# Patient Record
Sex: FEMALE | Race: ASIAN | NOT HISPANIC OR LATINO | Employment: FULL TIME | URBAN - METROPOLITAN AREA
[De-identification: names, ages, dates, MRNs, and addresses within clinical notes are randomized per-mention and may not be internally consistent; named-entity substitution may affect disease eponyms.]

---

## 2018-03-13 ENCOUNTER — TRANSFERRED RECORDS (OUTPATIENT)
Dept: HEALTH INFORMATION MANAGEMENT | Facility: CLINIC | Age: 33
End: 2018-03-13

## 2018-07-06 ENCOUNTER — OFFICE VISIT (OUTPATIENT)
Dept: FAMILY MEDICINE | Facility: CLINIC | Age: 33
End: 2018-07-06
Payer: COMMERCIAL

## 2018-07-06 VITALS
HEART RATE: 77 BPM | BODY MASS INDEX: 35.85 KG/M2 | WEIGHT: 210 LBS | TEMPERATURE: 98 F | DIASTOLIC BLOOD PRESSURE: 78 MMHG | SYSTOLIC BLOOD PRESSURE: 117 MMHG | OXYGEN SATURATION: 97 % | HEIGHT: 64 IN

## 2018-07-06 DIAGNOSIS — J45.20 MILD INTERMITTENT ASTHMA WITHOUT COMPLICATION: ICD-10-CM

## 2018-07-06 DIAGNOSIS — Z00.00 ENCOUNTER FOR ROUTINE ADULT HEALTH EXAMINATION WITHOUT ABNORMAL FINDINGS: Primary | ICD-10-CM

## 2018-07-06 DIAGNOSIS — Z30.44 ENCOUNTER FOR SURVEILLANCE OF VAGINAL RING HORMONAL CONTRACEPTIVE DEVICE: ICD-10-CM

## 2018-07-06 PROCEDURE — 99395 PREV VISIT EST AGE 18-39: CPT | Performed by: FAMILY MEDICINE

## 2018-07-06 PROCEDURE — 87624 HPV HI-RISK TYP POOLED RSLT: CPT | Performed by: FAMILY MEDICINE

## 2018-07-06 PROCEDURE — G0145 SCR C/V CYTO,THINLAYER,RESCR: HCPCS | Performed by: FAMILY MEDICINE

## 2018-07-06 RX ORDER — ALBUTEROL SULFATE 90 UG/1
2 AEROSOL, METERED RESPIRATORY (INHALATION) EVERY 6 HOURS PRN
Qty: 1 INHALER | Refills: 1 | Status: SHIPPED | OUTPATIENT
Start: 2018-07-06

## 2018-07-06 RX ORDER — ETONOGESTREL AND ETHINYL ESTRADIOL VAGINAL RING .015; .12 MG/D; MG/D
RING VAGINAL
Qty: 3 EACH | Refills: 3 | Status: SHIPPED | OUTPATIENT
Start: 2018-07-06 | End: 2019-10-04

## 2018-07-06 NOTE — MR AVS SNAPSHOT
After Visit Summary   7/6/2018    Aure Kolb    MRN: [id number]           Patient Information     Date Of Birth          1985        Visit Information        Provider Department      7/6/2018 1:30 PM Neida George MD [facility]        Today's Diagnoses     Encounter for routine adult health examination without abnormal findings    -  1    Encounter for surveillance of vaginal ring hormonal contraceptive device        Mild intermittent asthma without complication          Care Instructions    Look into Whole 30 diet and FODMAPS.        Preventive Health Recommendations  Female Ages 26 - 39  Yearly exam:   See your health care provider every year in order to    Review health changes.     Discuss preventive care.      Review your medicines if you your doctor has prescribed any.    Until age 30: Get a Pap test every three years (more often if you have had an abnormal result).    After age 30: Talk to your doctor about whether you should have a Pap test every 3 years or have a Pap test with HPV screening every 5 years.   You do not need a Pap test if your uterus was removed (hysterectomy) and you have not had cancer.  You should be tested each year for STDs (sexually transmitted diseases), if you're at risk.   Talk to your provider about how often to have your cholesterol checked.  If you are at risk for diabetes, you should have a diabetes test (fasting glucose).  Shots: Get a flu shot each year. Get a tetanus shot every 10 years.   Nutrition:     Eat at least 5 servings of fruits and vegetables each day.    Eat whole-grain bread, whole-wheat pasta and brown rice instead of white grains and rice.    Get adequate Calcium and Vitamin D.     Lifestyle    Exercise at least 150 minutes a week (30 minutes a day, 5 days of the week). This will help you control your weight and prevent disease.    Limit alcohol to one drink per day.    No smoking.     Wear sunscreen to prevent skin  "cancer.    See your dentist every six months for an exam and cleaning.            Follow-ups after your visit        Who to contact     If you have questions or need follow up information about today's clinic visit or your schedule please contact [facility] directly at [phone].  Normal or non-critical lab and imaging results will be communicated to you by MyChart, letter or phone within 4 business days after the clinic has received the results. If you do not hear from us within 7 days, please contact the clinic through MyChart or phone. If you have a critical or abnormal lab result, we will notify you by phone as soon as possible.  Submit refill requests through [employer] or call your pharmacy and they will forward the refill request to us. Please allow 3 business days for your refill to be completed.          Additional Information About Your Visit        [place] Information     [facility] [employer] lets you send messages to your doctor, view your test results, renew your prescriptions, schedule appointments and more. To sign up, go to [email or web address] . Click on \"Log in\" on the left side of the screen, which will take you to the Welcome page. Then click on \"Sign up Now\" on the right side of the page.     You will be asked to enter the access code listed below, as well as some personal information. Please follow the directions to create your username and password.     Your access code is: [email or web address]  Expires: 10/4/2018  2:14 PM     Your access code will  in 90 days. If you need help or a new code, please call your [place] clinic or [phone].        Care EveryWhere ID     This is your Care EveryWhere ID. This could be used by other organizations to access your [place] medical records  [id number]        Your Vitals Were     Pulse Temperature Height Last Period Pulse Oximetry Breastfeeding?    77 98  F (36.7  C) (Oral) 5' 3.75\" (1.619 m) 2018 (Exact Date) 97% No    BMI (Body Mass Index) "                   36.33 kg/m2            Blood Pressure from Last 3 Encounters:   07/06/18 117/78   10/26/16 115/75    Weight from Last 3 Encounters:   07/06/18 210 lb (95.3 kg)   10/26/16 214 lb (97.1 kg)              We Performed the Following     HPV High Risk Types DNA Cervical     Pap imaged thin layer screen with HPV - recommended age 30 - 65 years (select HPV order below)          Today's Medication Changes          These changes are accurate as of 7/6/18  2:22 PM.  If you have any questions, ask your nurse or doctor.               Start taking these medicines.        Dose/Directions    albuterol 108 (90 Base) MCG/ACT Inhaler   Commonly known as:  PROAIR HFA/PROVENTIL HFA/VENTOLIN HFA   Used for:  Mild intermittent asthma without complication   Started by:  Neida George MD        Dose:  2 puff   Inhale 2 puffs into the lungs every 6 hours as needed for shortness of breath / dyspnea or wheezing   Quantity:  1 Inhaler   Refills:  1            Where to get your medicines      These medications were sent to [facility][id number] - [address] [address]     Phone:  [phone]     albuterol 108 (90 Base) MCG/ACT Inhaler    etonogestrel-ethinyl estradiol 0.12-0.015 MG/24HR vaginal ring                Primary Care Provider Office Phone # Fax #    Neida George [phone] [address] [address]        Equal Access to Services     B[facility]INE [facility] AH: Hadii aad ku hadasho Soomaali, waaxda luqadaha, qaybta kaalmada adeegyada, waxay maki haygregoria bundy . So [facility] [phone].    ATENCIÓN: Si habla español, tiene a burns disposición servicios gratuitos de asistencia lingüística. Llame al [phone].    We comply with applicable federal civil rights laws and [place] laws. We do not discriminate on the basis of race, color, national origin, age, disability, sex, sexual  orientation, or gender identity.            Thank you!     Thank you for choosing [facility]  for your care. Our goal is always to provide you with excellent care. Hearing back from our patients is one way we can continue to improve our services. Please take a few minutes to complete the written survey that you may receive in the mail after your visit with us. Thank you!             Your Updated Medication List - Protect others around you: Learn how to safely use, store and throw away your medicines at www.disposemymeds.org.          This list is accurate as of 7/6/18  2:22 PM.  Always use your most recent med list.                   Brand Name Dispense Instructions for use Diagnosis    albuterol 108 (90 Base) MCG/ACT Inhaler    PROAIR HFA/PROVENTIL HFA/VENTOLIN HFA    1 Inhaler    Inhale 2 puffs into the lungs every 6 hours as needed for shortness of breath / dyspnea or wheezing    Mild intermittent asthma without complication       CLARITIN 10 MG tablet   Generic drug:  loratadine     30 tablet    Take 1 tablet (10 mg) by mouth daily        etonogestrel-ethinyl estradiol 0.12-0.015 MG/24HR vaginal ring    NUVARING    3 each    Insert 1 ring vaginally every 21 days then remove for 1 week then repeat with new ring.    Encounter for surveillance of vaginal ring hormonal contraceptive device       PROGESTERONE 100MG/G CREAM      Apply 2 Units topically At Bedtime

## 2018-07-06 NOTE — LETTER
My Asthma Action Plan  Name: Aure Kolb   YOB: 1985  Date: 7/6/2018   My doctor: Neida George MD   My clinic: [facility]        My Control Medicine: None  My Rescue Medicine: Albuterol (Proair/Ventolin/Proventil) inhaler as needed   My Asthma Severity: intermittent  Avoid your asthma triggers: dust mites, pollens, mold and strong odors and fumes           GREEN ZONE   Good Control    I feel good    No cough or wheeze    Can work, sleep and play without asthma symptoms       Take your asthma control medicine every day.     1. If exercise triggers your asthma, take your rescue medication    15 minutes before exercise or sports, and    During exercise if you have asthma symptoms  2. Spacer to use with inhaler: If you have a spacer, make sure to use it with your inhaler             YELLOW ZONE Getting Worse  I have ANY of these:    I do not feel good    Cough or wheeze    Chest feels tight    Wake up at night   1. Keep taking your Green Zone medications  2. Start taking your rescue medicine:    every 20 minutes for up to 1 hour. Then every 4 hours for 24-48 hours.  3. If you stay in the Yellow Zone for more than 12-24 hours, contact your doctor.  4. If you do not return to the Green Zone in 12-24 hours or you get worse, start taking your oral steroid medicine if prescribed by your provider.           RED ZONE Medical Alert - Get Help  I have ANY of these:    I feel awful    Medicine is not helping    Breathing getting harder    Trouble walking or talking    Nose opens wide to breathe       1. Take your rescue medicine NOW  2. If your provider has prescribed an oral steroid medicine, start taking it NOW  3. Call your doctor NOW  4. If you are still in the Red Zone after 20 minutes and you have not reached your doctor:    Take your rescue medicine again and    Call 911 or go to the emergency room right away    See your regular doctor within 2 weeks of an Emergency Room or Urgent  Care visit for follow-up treatment.          Annual Reminders:  Meet with Asthma Educator,  Flu Shot in the Fall, consider Pneumonia Vaccination for patients with asthma (aged 19 and older).    Pharmacy: [id number] [address]                       Asthma Triggers  How To Control Things That Make Your Asthma Worse    Triggers are things that make your asthma worse.  Look at the list below to help you find your triggers and what you can do about them.  You can help prevent asthma flare-ups by staying away from your triggers.      Trigger                                                          What you can do   Cigarette Smoke  Tobacco smoke can make asthma worse. Do not allow smoking in your home, car or around you.  Be sure no one smokes at a child s day care or school.  If you smoke, ask your health care provider for ways to help you quit.  Ask family members to quit too.  Ask your health care provider for a referral to Quit Plan to help you quit smoking, or call 4-067-520-PLAN.     Colds, Flu, Bronchitis  These are common triggers of asthma. Wash your hands often.  Don t touch your eyes, nose or mouth.  Get a flu shot every year.     Dust Mites  These are tiny bugs that live in cloth or carpet. They are too small to see. Wash sheets and blankets in hot water every week.   Encase pillows and mattress in dust mite proof covers.  Avoid having carpet if you can. If you have carpet, vacuum weekly.   Use a dust mask and HEPA vacuum.   Pollen and Outdoor Mold  Some people are allergic to trees, grass, or weed pollen, or molds. Try to keep your windows closed.  Limit time out doors when pollen count is high.   Ask you health care provider about taking medicine during allergy season.     Animal Dander  Some people are allergic to skin flakes, urine or saliva from pets with fur or feathers. Keep pets with fur or feathers out of your home.    If you can t keep the pet  outdoors, then keep the pet out of your bedroom.  Keep the bedroom door closed.  Keep pets off cloth furniture and away from stuffed toys.     Mice, Rats, and Cockroaches  Some people are allergic to the waste from these pests.   Cover food and garbage.  Clean up spills and food crumbs.  Store grease in the refrigerator.   Keep food out of the bedroom.   Indoor Mold  This can be a trigger if your home has high moisture. Fix leaking faucets, pipes, or other sources of water.   Clean moldy surfaces.  Dehumidify basement if it is damp and smelly.   Smoke, Strong Odors, and Sprays  These can reduce air quality. Stay away from strong odors and sprays, such as perfume, powder, hair spray, paints, smoke incense, paint, cleaning products, candles and new carpet.   Exercise or Sports  Some people with asthma have this trigger. Be active!  Ask your doctor about taking medicine before sports or exercise to prevent symptoms.    Warm up for 5-10 minutes before and after sports or exercise.     Other Triggers of Asthma  Cold air:  Cover your nose and mouth with a scarf.  Sometimes laughing or crying can be a trigger.  Some medicines and food can trigger asthma.

## 2018-07-06 NOTE — LETTER
July 19, 2018    Aure Kolb  [address] [place] [address]    Dear Aure,  We are happy to inform you that your PAP smear result from 07/06/18 is normal.  We are now able to do a follow up test on PAP smears. The DNA test is for HPV (Human Papilloma Virus). Cervical cancer is closely linked with certain types of HPV. Your results showed no evidence of high risk HPV.  Therefore we recommend you return in 5 years for your next pap smear and HPV test.  You will still need to return to the clinic every year for an annual exam and other preventive tests.  Please contact the clinic at [phone] with any questions.  Sincerely,    Neida George MD/[facility]

## 2018-07-06 NOTE — PATIENT INSTRUCTIONS
Look into Whole 30 diet and FODMAPS.        Preventive Health Recommendations  Female Ages 26 - 39  Yearly exam:   See your health care provider every year in order to    Review health changes.     Discuss preventive care.      Review your medicines if you your doctor has prescribed any.    Until age 30: Get a Pap test every three years (more often if you have had an abnormal result).    After age 30: Talk to your doctor about whether you should have a Pap test every 3 years or have a Pap test with HPV screening every 5 years.   You do not need a Pap test if your uterus was removed (hysterectomy) and you have not had cancer.  You should be tested each year for STDs (sexually transmitted diseases), if you're at risk.   Talk to your provider about how often to have your cholesterol checked.  If you are at risk for diabetes, you should have a diabetes test (fasting glucose).  Shots: Get a flu shot each year. Get a tetanus shot every 10 years.   Nutrition:     Eat at least 5 servings of fruits and vegetables each day.    Eat whole-grain bread, whole-wheat pasta and brown rice instead of white grains and rice.    Get adequate Calcium and Vitamin D.     Lifestyle    Exercise at least 150 minutes a week (30 minutes a day, 5 days of the week). This will help you control your weight and prevent disease.    Limit alcohol to one drink per day.    No smoking.     Wear sunscreen to prevent skin cancer.    See your dentist every six months for an exam and cleaning.
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
## 2018-07-10 LAB
COPATH REPORT: NORMAL
PAP: NORMAL

## 2018-07-12 LAB
FINAL DIAGNOSIS: NORMAL
HPV HR 12 DNA CVX QL NAA+PROBE: NEGATIVE
HPV16 DNA SPEC QL NAA+PROBE: NEGATIVE
HPV18 DNA SPEC QL NAA+PROBE: NEGATIVE
SPECIMEN DESCRIPTION: NORMAL
SPECIMEN SOURCE CVX/VAG CYTO: NORMAL

## 2019-07-12 ENCOUNTER — OFFICE VISIT (OUTPATIENT)
Dept: FAMILY MEDICINE | Facility: CLINIC | Age: 34
End: 2019-07-12
Payer: COMMERCIAL

## 2019-07-12 VITALS
TEMPERATURE: 98.5 F | RESPIRATION RATE: 14 BRPM | DIASTOLIC BLOOD PRESSURE: 82 MMHG | HEART RATE: 78 BPM | HEIGHT: 64 IN | OXYGEN SATURATION: 99 % | BODY MASS INDEX: 36.16 KG/M2 | WEIGHT: 211.8 LBS | SYSTOLIC BLOOD PRESSURE: 115 MMHG

## 2019-07-12 DIAGNOSIS — M54.50 BILATERAL LOW BACK PAIN WITHOUT SCIATICA, UNSPECIFIED CHRONICITY: ICD-10-CM

## 2019-07-12 DIAGNOSIS — G44.209 TENSION HEADACHE: ICD-10-CM

## 2019-07-12 DIAGNOSIS — Z00.00 ROUTINE GENERAL MEDICAL EXAMINATION AT A HEALTH CARE FACILITY: Primary | ICD-10-CM

## 2019-07-12 DIAGNOSIS — G43.109 MIGRAINE AURA WITHOUT HEADACHE: ICD-10-CM

## 2019-07-12 LAB
CHOLEST SERPL-MCNC: 250 MG/DL
GLUCOSE SERPL-MCNC: 100 MG/DL (ref 70–99)
HDLC SERPL-MCNC: 64 MG/DL
LDLC SERPL CALC-MCNC: 154 MG/DL
NONHDLC SERPL-MCNC: 186 MG/DL
TRIGL SERPL-MCNC: 161 MG/DL

## 2019-07-12 PROCEDURE — 99395 PREV VISIT EST AGE 18-39: CPT | Performed by: FAMILY MEDICINE

## 2019-07-12 PROCEDURE — 82947 ASSAY GLUCOSE BLOOD QUANT: CPT | Performed by: FAMILY MEDICINE

## 2019-07-12 PROCEDURE — 80061 LIPID PANEL: CPT | Performed by: FAMILY MEDICINE

## 2019-07-12 PROCEDURE — 36415 COLL VENOUS BLD VENIPUNCTURE: CPT | Performed by: FAMILY MEDICINE

## 2019-07-12 RX ORDER — DIPHENHYDRAMINE HCL 25 MG
25 CAPSULE ORAL EVERY 6 HOURS PRN
COMMUNITY

## 2019-07-12 ASSESSMENT — PAIN SCALES - GENERAL: PAINLEVEL: MODERATE PAIN (5)

## 2019-07-12 ASSESSMENT — MIFFLIN-ST. JEOR: SCORE: 1635.72
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
## 2019-07-17 ENCOUNTER — TELEPHONE (OUTPATIENT)
Dept: FAMILY MEDICINE | Facility: CLINIC | Age: 34
End: 2019-07-17

## 2019-07-17 NOTE — RESULT ENCOUNTER NOTE
Please send letter below with copy of results.  Thanks! CW    Here are your lab results from your recent visit...  -Your cholesterol panel looks abnormal with a high LDL (the bad cholesterol), though your HDL (the good cholesterol) looks very good.  Your triglycerides are also higher than we like to see, which can be linked to higher carbohydrates/sugars in the diet.  -Your glucose also just slipped into the 'pre-diabetic range' at 100 (100-125 is the pre-diabetic range when you are fasting).    I would recommend making an appointment so we can talk about these in relationship to your diet in further depth.  If you are able, collecting a 1-2 week food diary of everything you eat can be really helpful.    Best,   Saad George MD

## 2019-07-17 NOTE — TELEPHONE ENCOUNTER
7/17/19 abnl lab results needs f/u appt and to keep a food diary for 2 weeks and bring it to appt. Alex Gonzales ma

## 2019-07-17 NOTE — TELEPHONE ENCOUNTER
Spoke with patient.  Explained result notes/keeping food diary.    Scheduled patient for follow up with CW 8/2.  Brenna Pizarro RN

## 2019-10-04 DIAGNOSIS — Z30.44 ENCOUNTER FOR SURVEILLANCE OF VAGINAL RING HORMONAL CONTRACEPTIVE DEVICE: ICD-10-CM

## 2019-10-07 NOTE — TELEPHONE ENCOUNTER
"CW noted at 7/12/2019 appt:   Pt would like rx sent for nuvaring just in case she wants to restart it, though gained 6 lbs on it in a month this past year.    CW,   Called pt - she is thinking about restarting Nuvaring  Would like Rx for it  If issues or has to come back in again to get it then will just skip it  Please advise  Thanks,  Shelby CENTENO RN    Last Written Prescription Date:  7/6/2018  Last Fill Quantity: 3,  # refills: 3   Last office visit: 7/12/2019 with prescribing provider:     Future Office Visit:    Requested Prescriptions   Pending Prescriptions Disp Refills     etonogestrel-ethinyl estradiol (NUVARING) 0.12-0.015 MG/24HR vaginal ring [Pharmacy Med Name: NUVARING]  0     Sig: INSERT 1 RING VAGINALLY EVERY 21 DAYS THEN REMOVE FOR 1 WEEK, REPEAT WITH NEW RING       Contraceptives Protocol Passed - 10/4/2019  5:56 PM        Passed - Patient is not a current smoker if age is 35 or older        Passed - Recent (12 mo) or future (30 days) visit within the authorizing provider's specialty     Patient has had an office visit with the authorizing provider or a provider within the authorizing providers department within the previous 12 mos or has a future within next 30 days. See \"Patient Info\" tab in inbasket, or \"Choose Columns\" in Meds & Orders section of the refill encounter.              Passed - Medication is active on med list        Passed - No active pregnancy on record        Passed - No positive pregnancy test in past 12 months        "

## 2019-10-08 RX ORDER — ETONOGESTREL AND ETHINYL ESTRADIOL VAGINAL RING .015; .12 MG/D; MG/D
RING VAGINAL
Qty: 3 EACH | Refills: 3 | Status: SHIPPED | OUTPATIENT
Start: 2019-10-08 | End: 2021-02-09

## 2020-05-04 ENCOUNTER — TELEPHONE (OUTPATIENT)
Dept: FAMILY MEDICINE | Facility: CLINIC | Age: 35
End: 2020-05-04

## 2020-05-04 NOTE — TELEPHONE ENCOUNTER
Summary:    Patient is due/failing the following:   Updated flu shot 10/22/19    Type of outreach:    Action needed:     If need for provider review:    Please indicate OV, lab, MTM, or nurse appt if needed.  Indicate fasting or not fasting.                                                                                                                                          Alex Gonzales ma

## 2020-11-18 ENCOUNTER — TELEPHONE (OUTPATIENT)
Dept: FAMILY MEDICINE | Facility: CLINIC | Age: 35
End: 2020-11-18

## 2020-11-18 DIAGNOSIS — Z20.822 EXPOSURE TO 2019 NOVEL CORONAVIRUS: Primary | ICD-10-CM

## 2020-11-18 NOTE — TELEPHONE ENCOUNTER
[facility] phone call message- general phone call:    Reason for call: Pt has been exposed but is not showing any symptoms. Wondering about testing.    Return call needed: Yes    OK to leave a message on voice mail? Yes    Primary language: English      needed? No    Call taken on November 18, 2020 at 11:41 AM by Celestina Jackson

## 2020-11-18 NOTE — TELEPHONE ENCOUNTER
"Patient is requesting COVID-19 PCR testing. They are currently asymptomatic, and meet the following criteria for testing per the July 14, 2020 [facility] testing guidelines: 7/14 Asymptomatic criteria: has had contact within 6 feet for >/= 15 min of COVID-19 cases    If patient has had close contact (within 6 ft for >/= 15 min), recommend patient gets PCR testing between days 5-7 after exposure. Also recommend 14 day quarantine per MD:     \"All close contacts should follow a 14-day quarantine period. CDC recommends that close contacts be PCR-tested for COVID-19. Even if the result is negative, these contacts should continue to quarantine for a full 14 days after last exposure and monitor for symptoms; infection could develop at any time during the quarantine period. Repeat testing at the end of the quarantine period may be recommended for staff or residents of congregate settings; refer to setting-specific testing guidance for additional information. Specific guidance is available for health care workers who have a health care exposure and these individuals should follow that guidance; it would apply if they have a community/household contact.\"  ([email or web address]./Counts include 234 beds at the [email or web address]):    Patient transferred to  to schedule 20 min test only (no charge) visit with RRU provider. Order placed under RRU preceptor. RN copied on results. ./LR        "

## 2020-11-19 DIAGNOSIS — Z20.822 EXPOSURE TO 2019 NOVEL CORONAVIRUS: ICD-10-CM

## 2020-11-19 LAB
COVID-19 VIRUS PCR TO U OF MN - SOURCE: NORMAL
SARS-COV-2 RNA SPEC QL NAA+PROBE: NOT DETECTED

## 2020-11-19 PROCEDURE — 99207 PR NO CHARGE LOS: CPT

## 2020-11-19 NOTE — LETTER
November 24, 2020      Aurestefan Antonyll  [address] [place] [address]        Dear ,    We are writing to inform you of your test results.    Your COVID 19 swab was negative.  Please continue to watch for any worsening symptoms and please call or schedule follow up if you have any concerns.     Resulted Orders   COVID-19 Virus PCR MRF ([facility])   Result Value Ref Range    COVID-19 Virus PCR to [facility] - Source Nasopharyngeal     COVID-19 Virus PCR to [facility] - Result Not Detected       Comment:      Collection of multiple specimens from the same patient may be necessary to  detect the virus. The possibility of a false negative should be considered if  the patient's recent exposure or clinical presentation suggests 2019 nCOV  infection and diagnostic tests for other causes of illness are negative.   Repeat  testing may be considered in this setting.  Patient sample was heat inactivated and amplified using the HDPCR SARS-CoV-2  assay ([employer] The HDPCRTM SARS-CoV-2 assay is a reverse  transcription real-time polymerase chain reaction (qRT-PCR) test intended for  the qualitative detection of nucleic acid  from SARS-CoV-2 in human nasopharyngeal swabs, oropharyngeal swabs, anterior  nasal swabs, mid-turbinate nasal swabs as well as nasal aspirate, nasal wash,  and bronchoalveolar lavage (BAL) specimens from individuals who are suspected  of COVID-19 by their healthcare provider.  A negative result does not rule out the presence of real-time PCR inhibitors   in  the specimen  or COVID-19 RNA in concentrations below the limit of detection of  the assay. The possibility of a false negative should be considered if the  patients recent exposure or clinical presentation suggests COVID-19.   Additional  testing or repeat testing requires consultation with the laboratory.  Nasopharyngeal specimen is the preferred choice for swab-based SARS CoV2  testing. When collection of a nasopharyngeal swab is  not possible the   following  are acceptable alternatives:  an oropharyngeal (OP) specimen collected by a healthcare professional, or a  nasal mid-turbinate (NMT) swab collected by a healthcare professional or by  onsite self-collection (using a flocked tapered swab), or an anterior nares  specimen collected by a healthcare professional or by onsite self-collection  (using a round foam swab). (Centers for Disease Control)  Testing performed by [facility] ([facility]) [address] [facility] [address]   The test performance characteristics were determined by [facility]. It has not been  cleared or approved by the FDA.  The laboratory is regulated under the Clinical Laboratory Improvement  Amendments of 1988 (CLIA-88) as qualified to perform high-complexity testing.  This test is used for clinical purposes. It should not be regarded as  investigational or for research.  Performed and/or entered by:  [facility]  [address] [place] [address]       Narrative    Test performed by:  [facility]  [address], [place] [address]       If you have any questions or concerns, please call the clinic at the number listed above.       Sincerely,        [facility] Nurse

## 2020-11-21 NOTE — RESULT ENCOUNTER NOTE
Aure Kolb-    Your COVID 19 swab was negative.  Please continue to watch for any worsening symptoms and please call or schedule follow up if you have any concerns.     Dayana Augustine MD    Please send results to patient.

## 2020-12-27 ENCOUNTER — HEALTH MAINTENANCE LETTER (OUTPATIENT)
Age: 35
End: 2020-12-27

## 2021-02-09 ENCOUNTER — OFFICE VISIT (OUTPATIENT)
Dept: OBGYN | Facility: CLINIC | Age: 36
End: 2021-02-09
Payer: COMMERCIAL

## 2021-02-09 VITALS
BODY MASS INDEX: 35.02 KG/M2 | HEART RATE: 77 BPM | DIASTOLIC BLOOD PRESSURE: 72 MMHG | WEIGHT: 204 LBS | SYSTOLIC BLOOD PRESSURE: 128 MMHG | TEMPERATURE: 94.9 F

## 2021-02-09 DIAGNOSIS — Z00.00 ROUTINE HEALTH MAINTENANCE: ICD-10-CM

## 2021-02-09 DIAGNOSIS — N93.9 ABNORMAL UTERINE BLEEDING (AUB): Primary | ICD-10-CM

## 2021-02-09 DIAGNOSIS — G43.909 MIGRAINE DUE TO ESTROGENIC CONTRACEPTIVE: ICD-10-CM

## 2021-02-09 DIAGNOSIS — T38.4X5A MIGRAINE DUE TO ESTROGENIC CONTRACEPTIVE: ICD-10-CM

## 2021-02-09 LAB
ERYTHROCYTE [DISTWIDTH] IN BLOOD BY AUTOMATED COUNT: 16.1 % (ref 10–15)
FERRITIN SERPL-MCNC: 7 NG/ML (ref 12–150)
HBA1C MFR BLD: 5.6 % (ref 0–5.6)
HCT VFR BLD AUTO: 36.3 % (ref 35–47)
HCV AB SERPL QL IA: NONREACTIVE
HGB BLD-MCNC: 11.4 G/DL (ref 11.7–15.7)
HIV 1+2 AB+HIV1 P24 AG SERPL QL IA: NONREACTIVE
IRON SATN MFR SERPL: 7 % (ref 15–46)
IRON SERPL-MCNC: 31 UG/DL (ref 35–180)
MCH RBC QN AUTO: 25.3 PG (ref 26.5–33)
MCHC RBC AUTO-ENTMCNC: 31.4 G/DL (ref 31.5–36.5)
MCV RBC AUTO: 81 FL (ref 78–100)
PLATELET # BLD AUTO: 318 10E9/L (ref 150–450)
RBC # BLD AUTO: 4.51 10E12/L (ref 3.8–5.2)
TIBC SERPL-MCNC: 476 UG/DL (ref 240–430)
WBC # BLD AUTO: 6.6 10E9/L (ref 4–11)

## 2021-02-09 PROCEDURE — 36415 COLL VENOUS BLD VENIPUNCTURE: CPT | Performed by: OBSTETRICS & GYNECOLOGY

## 2021-02-09 PROCEDURE — 99204 OFFICE O/P NEW MOD 45 MIN: CPT | Performed by: OBSTETRICS & GYNECOLOGY

## 2021-02-09 PROCEDURE — 99000 SPECIMEN HANDLING OFFICE-LAB: CPT | Performed by: OBSTETRICS & GYNECOLOGY

## 2021-02-09 PROCEDURE — 84270 ASSAY OF SEX HORMONE GLOBUL: CPT | Performed by: OBSTETRICS & GYNECOLOGY

## 2021-02-09 PROCEDURE — 82728 ASSAY OF FERRITIN: CPT | Performed by: OBSTETRICS & GYNECOLOGY

## 2021-02-09 PROCEDURE — 83550 IRON BINDING TEST: CPT | Performed by: OBSTETRICS & GYNECOLOGY

## 2021-02-09 PROCEDURE — 85027 COMPLETE CBC AUTOMATED: CPT | Performed by: OBSTETRICS & GYNECOLOGY

## 2021-02-09 PROCEDURE — 87389 HIV-1 AG W/HIV-1&-2 AB AG IA: CPT | Performed by: OBSTETRICS & GYNECOLOGY

## 2021-02-09 PROCEDURE — 86803 HEPATITIS C AB TEST: CPT | Performed by: OBSTETRICS & GYNECOLOGY

## 2021-02-09 PROCEDURE — 84403 ASSAY OF TOTAL TESTOSTERONE: CPT | Mod: 90 | Performed by: OBSTETRICS & GYNECOLOGY

## 2021-02-09 PROCEDURE — 83540 ASSAY OF IRON: CPT | Performed by: OBSTETRICS & GYNECOLOGY

## 2021-02-09 PROCEDURE — 83036 HEMOGLOBIN GLYCOSYLATED A1C: CPT | Performed by: OBSTETRICS & GYNECOLOGY

## 2021-02-09 NOTE — NURSING NOTE
"Chief Complaint   Patient presents with     Patient Request     fibroids       Initial /72 (BP Location: Left arm, Patient Position: Sitting, Cuff Size: Adult Regular)   Pulse 77   Temp 94.9  F (34.9  C) (Temporal)   Wt 92.5 kg (204 lb)   BMI 35.02 kg/m   Estimated body mass index is 35.02 kg/m  as calculated from the following:    Height as of 19: 1.626 m (5' 4\").    Weight as of this encounter: 92.5 kg (204 lb).  BP completed using cuff size: regular    Questioned patient about current smoking habits.  Pt. has never smoked.          The following HM Due: NONE      The following patient reported/Care Every where data was sent to:  P ABSTRACT QUALITY INITIATIVES [[id number]]  KVNG Batres MA           "

## 2021-02-09 NOTE — PROGRESS NOTES
"GYN Clinic Visit    Date of visit: 2021     Chief Complaint:   Chief Complaint   Patient presents with     Patient Request     fibroids       HPI:   Aure Kolb is a 35 year old  who presents to clinic today to discuss abnormal uterine bleeding, concern for fibroids.     Complains of \"really heavy periods.\"  Have always been heavy, but were better on birth control.  Reports they were less heavy and more consistent.      She stopped birth control 4 years ago.  Had concerns about hormone imbalances, including thyroid, so decided to go off and see if things got better.  However, since then, cycles have been unpredictable, but seemingly more close together, around q21d.  Over time, they continue to get heavier and more painful.  Reports cramps the week before her period start that get better before menses.  Then, has light bleeding first day days of cycle, following by heavy bleeding and cramping.  Has never had this concern evaluated before.    Has history of hysteroscopy for polyps in  (op note available), as well as hysteroscopy or D&C for polyps in  (op note not available).  These procedures did help over a period of time, but then heavy bleeding returned.    Reports seeing a naturopathic doctor (ND) for \"weird hormone imbalances.\"  This provider,  Jacey Bradley, has recommended a number of herbal supplements, many for adrenal support.  These supplements have also helped her lose weight, a total of 15lb at this point.    Diagnosed with diabetes recently, but rechecked over the summer and didn't have it anymore when she saw endocrinologist.    History of anemia thought to be due to heavy cycles.  Also feels this is contributing to thinning hair.    Medications:       albuterol (PROAIR HFA/PROVENTIL HFA/VENTOLIN HFA) 108 (90 Base) MCG/ACT Inhaler, Inhale 2 puffs into the lungs every 6 hours as needed for shortness of breath / dyspnea or wheezing       diphenhydrAMINE (BENADRYL) 25 MG capsule, " Take 25 mg by mouth every 6 hours as needed for itching or allergies       loratadine (CLARITIN) 10 MG tablet, Take 1 tablet (10 mg) by mouth daily    No current facility-administered medications on file prior to visit.       Allergy:  Allergies   Allergen Reactions     Amoxicillin Anaphylaxis     Benzoyl Peroxide      PN: LW Reaction: swelling     Doxycycline      PN: LW Reaction: RESPIRATORY DISTRESS     Fexofenadine Difficulty breathing     Hpv 9-Valent Recomb Vaccine [Quadrivalent Hpv, 6,11,16,18] Difficulty breathing     Minocycline      PN: LW Reaction: RESPIRATORY DISTRESS     Penicillins Rash     Sulfa Drugs Unknown and Rash     As a baby       Obstetric History:   OB History    Para Term  AB Living   0 0 0 0 0 0   SAB TAB Ectopic Multiple Live Births   0 0 0 0 0     Gynecologic History:  Menarche: 9  Menses: see above  No LMP recorded.  STI history: neg  Last Pap: 18  Contraceptive History: oral contraceptives (bad HA), Mirena (painful, acne) and ring.    Past Medical History:   Diagnosis Date     Bulging lumbar disc     dx clinically chiro- given voltaren gel     Migraine aura without headache     woozy, spacy, blurry vision, dry mouth- when weather changes, started since age ~25 yrs     Obesity      Seasonal allergic rhinitis     claritin, benadryl if really needed (nose bleeds on nasal sprays)     Tension headache        Past Surgical History:   Procedure Laterality Date     DILATION AND CURETTAGE      uterine polyps, ' and      wisdom teeth         Social History:  Alcohol use  Social History    Substance and Sexual Activity      Alcohol use: Yes        Alcohol/week: 1.0 standard drinks        Types: 1 Standard drinks or equivalent per week        Comment: rarely    Tobacco use  History   Smoking Status     Never Smoker   Smokeless Tobacco     Never Used     Drug use  History   Drug Use No     Sexual history  History   Sexual Activity     Sexual activity: Not Currently        Family History   Adopted: Yes   Problem Relation Age of Onset     Medical History Unknown Other         from manuel     Review of Systems:  No hirsuitism.  Pos acne.  Pos thinning hair      Physical Exam:  Vitals:    21 0904   BP: 128/72   BP Location: Left arm   Patient Position: Sitting   Cuff Size: Adult Regular   Pulse: 77   Temp: 94.9  F (34.9  C)   TempSrc: Temporal   Weight: 92.5 kg (204 lb)       Gen: NAD, Awake and alert, cooperative with exam  HEENT: normocephalic, atraumatic. Anicteric sclerae.   CV:Normal pulse.  No cyanosis  Resp: lNo increased work of breathing or audible wheezing  Abd: soft, nontender, nondistended, no rebound, no guarding  Extremities: nontender, no edema  : normal appearing external genitalia, intact normal appearing vaginal mucosa without lesions or abnormal discharge; cervix appears smooth;  bimanual exam reveals uterus difficult to palpate, but feels mildly enlarged.  Unable to palpate ovaries.      Assessment:  Aure Kolb is a 35 year old  who presents with chief complaint   Chief Complaint   Patient presents with     Patient Request     fibroids       1. Abnormal uterine bleeding (AUB)  Discussed differential diagnosis including structural causes like polyps and/or fibroids, PCOS, and anovulatory cycles.  Discussed diagnostic criteria for PCOS; thus far does meet criteria for physical evidence of elevated testosterone.  History of anemia thought to be due to abnormal uterine bleeding, so will check CBC and iron studies as well  Also screening for s/sx of metabolic syndrome.  - US Transvaginal Non OB; Future  - Testosterone Free and Total  - Lipid Profile; Future  - Hemoglobin A1c  - IRON AND IRON BINDING CAPACITY  - FERRITIN  - CBC with platelets    2. Migraine due to estrogenic contraceptive  Has had HA vs migraines while on estrogen containing contraceptives, so would not recommend estrogen in the future.    3. Routine health maintenance  Reviewed  health maintenance in her chart; will do once/life Hep C and HIV testing.  - HIV Antigen Antibody Combo  - Hepatitis C antibody    Follow-up pending results of the above evaluation.    Nina Lantigua MD    I spent a total time of 47 minutes on the day of the counter reviewing external notes, previous operative note,, discussing differential diagnosis of abnormal uterine bleeding and the evaluation for each with the patient and on documentation for this visit.

## 2021-02-10 ENCOUNTER — MYC MEDICAL ADVICE (OUTPATIENT)
Dept: OBGYN | Facility: CLINIC | Age: 36
End: 2021-02-10

## 2021-02-10 DIAGNOSIS — Z11.59 SCREENING FOR VIRAL DISEASE: Primary | ICD-10-CM

## 2021-02-10 DIAGNOSIS — D50.0 IRON DEFICIENCY ANEMIA DUE TO CHRONIC BLOOD LOSS: ICD-10-CM

## 2021-02-10 RX ORDER — HEPARIN SODIUM (PORCINE) LOCK FLUSH IV SOLN 100 UNIT/ML 100 UNIT/ML
5 SOLUTION INTRAVENOUS
Status: CANCELLED | OUTPATIENT
Start: 2021-02-10

## 2021-02-10 RX ORDER — HEPARIN SODIUM,PORCINE 10 UNIT/ML
5 VIAL (ML) INTRAVENOUS
Status: CANCELLED | OUTPATIENT
Start: 2021-02-10

## 2021-02-10 NOTE — TELEPHONE ENCOUNTER
Patient was seen in clinic yesterday. Inquiring about hemoglobin results and following up on your recommendation. Pt has been taking liquid iron twice daily (20mg total daily) since Nov per recommendation from Dr Bradley from [facility]. Pt is wondering if you recommend she increases her dose?   Neena Martínez, RN-BSN

## 2021-02-11 LAB
SHBG SERPL-SCNC: 57 NMOL/L (ref 30–135)
TESTOST FREE SERPL-MCNC: 0.45 NG/DL (ref 0.13–0.92)
TESTOST SERPL-MCNC: 37 NG/DL (ref 8–60)

## 2021-02-17 DIAGNOSIS — Z11.59 SCREENING FOR VIRAL DISEASE: ICD-10-CM

## 2021-02-17 DIAGNOSIS — N93.9 ABNORMAL UTERINE BLEEDING (AUB): ICD-10-CM

## 2021-02-17 LAB
CHOLEST SERPL-MCNC: 196 MG/DL
HDLC SERPL-MCNC: 66 MG/DL
LABORATORY COMMENT REPORT: NORMAL
LDLC SERPL CALC-MCNC: 107 MG/DL
NONHDLC SERPL-MCNC: 130 MG/DL
SARS-COV-2 RNA RESP QL NAA+PROBE: NEGATIVE
SARS-COV-2 RNA RESP QL NAA+PROBE: NORMAL
SPECIMEN SOURCE: NORMAL
SPECIMEN SOURCE: NORMAL
TRIGL SERPL-MCNC: 113 MG/DL

## 2021-02-17 PROCEDURE — 80061 LIPID PANEL: CPT | Performed by: PATHOLOGY

## 2021-02-17 PROCEDURE — 36415 COLL VENOUS BLD VENIPUNCTURE: CPT | Performed by: PATHOLOGY

## 2021-02-17 PROCEDURE — U0003 INFECTIOUS AGENT DETECTION BY NUCLEIC ACID (DNA OR RNA); SEVERE ACUTE RESPIRATORY SYNDROME CORONAVIRUS 2 (SARS-COV-2) (CORONAVIRUS DISEASE [COVID-19]), AMPLIFIED PROBE TECHNIQUE, MAKING USE OF HIGH THROUGHPUT TECHNOLOGIES AS DESCRIBED BY CMS-2020-01-R: HCPCS | Mod: 90 | Performed by: PATHOLOGY

## 2021-02-17 PROCEDURE — U0005 INFEC AGEN DETEC AMPLI PROBE: HCPCS | Mod: 90 | Performed by: PATHOLOGY

## 2021-02-23 ENCOUNTER — ANCILLARY PROCEDURE (OUTPATIENT)
Dept: ULTRASOUND IMAGING | Facility: CLINIC | Age: 36
End: 2021-02-23
Attending: OBSTETRICS & GYNECOLOGY
Payer: COMMERCIAL

## 2021-02-23 ENCOUNTER — INFUSION THERAPY VISIT (OUTPATIENT)
Dept: INFUSION THERAPY | Facility: CLINIC | Age: 36
End: 2021-02-23
Attending: OBSTETRICS & GYNECOLOGY
Payer: COMMERCIAL

## 2021-02-23 VITALS — DIASTOLIC BLOOD PRESSURE: 82 MMHG | TEMPERATURE: 98.5 F | SYSTOLIC BLOOD PRESSURE: 134 MMHG | HEART RATE: 78 BPM

## 2021-02-23 DIAGNOSIS — D50.0 IRON DEFICIENCY ANEMIA DUE TO CHRONIC BLOOD LOSS: Primary | ICD-10-CM

## 2021-02-23 DIAGNOSIS — N93.9 ABNORMAL UTERINE BLEEDING (AUB): ICD-10-CM

## 2021-02-23 PROCEDURE — 258N000003 HC RX IP 258 OP 636: Performed by: OBSTETRICS & GYNECOLOGY

## 2021-02-23 PROCEDURE — 96365 THER/PROPH/DIAG IV INF INIT: CPT

## 2021-02-23 PROCEDURE — 250N000011 HC RX IP 250 OP 636: Performed by: OBSTETRICS & GYNECOLOGY

## 2021-02-23 PROCEDURE — 76830 TRANSVAGINAL US NON-OB: CPT | Performed by: OBSTETRICS & GYNECOLOGY

## 2021-02-23 RX ORDER — HEPARIN SODIUM,PORCINE 10 UNIT/ML
5 VIAL (ML) INTRAVENOUS
Status: CANCELLED | OUTPATIENT
Start: 2021-02-25

## 2021-02-23 RX ORDER — HEPARIN SODIUM (PORCINE) LOCK FLUSH IV SOLN 100 UNIT/ML 100 UNIT/ML
5 SOLUTION INTRAVENOUS
Status: CANCELLED | OUTPATIENT
Start: 2021-02-25

## 2021-02-23 RX ADMIN — IRON SUCROSE 200 MG: 20 INJECTION, SOLUTION INTRAVENOUS at 12:22

## 2021-02-23 NOTE — LETTER
2/23/2021         RE: Aure Kolb  [address]  [place] [address]        Dear Colleague,    Thank you for referring your patient, Auer Kolb, to the [facility]. Please see a copy of my visit note below.    Nursing Note  Aure Kolb [facility] for:   Chief Complaint   Patient presents with     Infusion     venofer     During today's [facility] appointment, orders from Dr. Lantigua were completed.  Frequency: today is dose 1 of 5 total    Progress note:  Patient identification verified by name and date of birth.  Assessment completed.  Vitals recorded in Doc Flowsheets.  Patient was provided with education regarding medication/procedure and possible side effects.  Patient verbalized understanding.     present during visit today: Not Applicable.    Treatment Conditions: Pt observed 15 mins post 1st dose    Premedications: were not ordered.    Drug Waste Record: No    Infusion length and rate:  infusion given over approximately 15 minutes  480 ml/hr.    Labs: were not ordered for this appointment.    Vascular access: peripheral IV placed today.    Is the next appt scheduled? yes  Asymptomatic COVID test completed? Declined (done last week)    Post Infusion Assessment:  Patient tolerated infusion without incident.     Discharge Plan:   Follow up plan of care with: ongoing infusions at [facility]. and primary care provider,  Discharge instructions were reviewed with patient.  Patient/representative verbalized understanding of discharge instructions and all questions answered.  Patient discharged from [facility] in stable condition.    Gisel Ivy RN    Administrations This Visit     iron sucrose (VENOFER) 200 mg in sodium chloride 0.9 % 120 mL intermittent infusion     Admin Date  02/23/2021 Action  New Bag  Dose  200 mg Rate  480 mL/hr Route  Intravenous Administered By  Gisel Ivy, RN                /78   Pulse 83   Temp 98.5  F (36.9  C) (Oral)           Again, thank you for allowing me to participate in the care of your patient.        Sincerely,        [facility]

## 2021-02-23 NOTE — PROGRESS NOTES
Nursing Note  Aure Kolb presents [facility] for:   Chief Complaint   Patient presents with     Infusion     venofer     During [facility]'s [facility] appointment, orders from Dr. Lantigua were completed.  Frequency: today is dose 1 of 5 total    Progress note:  Patient identification verified by name and date of birth.  Assessment completed.  Vitals recorded in Doc Flowsheets.  Patient was provided with education regarding medication/procedure and possible side effects.  Patient verbalized understanding.     present during visit today: Not Applicable.    Treatment Conditions: Pt observed 15 mins post 1st dose    Premedications: were not ordered.    Drug Waste Record: No    Infusion length and rate:  infusion given over approximately 15 minutes  480 ml/hr.    Labs: were not ordered for this appointment.    Vascular access: peripheral IV placed today.    Is the next appt scheduled? yes  Asymptomatic COVID test completed? Declined (done last week)    Post Infusion Assessment:  Patient tolerated infusion without incident.     Discharge Plan:   Follow up plan of care with: ongoing infusions at [facility]. and primary care provider,  Discharge instructions were reviewed with patient.  Patient/representative verbalized understanding of discharge instructions and all questions answered.  Patient discharged from [facility] in stable condition.    Gisel Ivy, ARSEN    Administrations This Visit     iron sucrose (VENOFER) 200 mg in sodium chloride 0.9 % 120 mL intermittent infusion     Admin Date  02/23/2021 Action  New Bag Dose  200 mg Rate  480 mL/hr Route  Intravenous Administered By  Gisel Ivy, RN                /78   Pulse 83   Temp 98.5  F (36.9  C) (Oral)

## 2021-02-23 NOTE — PATIENT INSTRUCTIONS
Patient Education     Iron Sucrose injection  Brand Name: Venofer  What is this medicine?  IRON SUCROSE (CHELSY baig) is an iron complex. Iron is used to make healthy red blood cells, which carry oxygen and nutrients throughout the body. This medicine is used to treat iron deficiency anemia in people with chronic kidney disease.  How should I use this medicine?  This medicine is for infusion into a vein. It is given by a health care professional in a hospital or clinic setting.  Talk to your pediatrician regarding the use of this medicine in children. While this drug may be prescribed for children as young as 2 years for selected conditions, precautions do apply.  What side effects may I notice from receiving this medicine?  Side effects that you should report to your doctor or health care professional as soon as possible:    allergic reactions like skin rash, itching or hives, swelling of the face, lips, or tongue    breathing problems    changes in blood pressure    cough    fast, irregular heartbeat    feeling faint or lightheaded, falls    fever or chills    flushing, sweating, or hot feelings    joint or muscle aches/pains    seizures    swelling of the ankles or feet    unusually weak or tired  Side effects that usually do not require medical attention (report to your doctor or health care professional if they continue or are bothersome):    diarrhea    feeling achy    headache    irritation at site where injected    nausea, vomiting    stomach upset    tiredness  What may interact with this medicine?  Do not take this medicine with any of the following medications:    deferoxamine    dimercaprol    other iron products  This medicine may also interact with the following medications:    chloramphenicol    deferasirox  What if I miss a dose?  It is important not to miss your dose. Call your doctor or health care professional if you are unable to keep an appointment.  Where should I keep my medicine?  This  drug is given in a hospital or clinic and will not be stored at home.  What should I tell my health care provider before I take this medicine?  They need to know if you have any of these conditions:    anemia not caused by low iron levels    heart disease    high levels of iron in the blood    kidney disease    liver disease    an unusual or allergic reaction to iron, other medicines, foods, dyes, or preservatives    pregnant or trying to get pregnant    breast-feeding  What should I watch for while using this medicine?  Visit your doctor or healthcare professional regularly. Tell your doctor or healthcare professional if your symptoms do not start to get better or if they get worse. You may need blood work done while you are taking this medicine.  You may need to follow a special diet. Talk to your doctor. Foods that contain iron include: whole grains/cereals, dried fruits, beans, or peas, leafy green vegetables, and organ meats (liver, kidney).  NOTE:This sheet is a summary. It may not cover all possible information. If you have questions about this medicine, talk to your doctor, pharmacist, or health care provider. Copyright  2020 [employer]

## 2021-02-25 ENCOUNTER — VIRTUAL VISIT (OUTPATIENT)
Dept: OBGYN | Facility: CLINIC | Age: 36
End: 2021-02-25
Payer: COMMERCIAL

## 2021-02-25 DIAGNOSIS — N80.03 ADENOMYOSIS: ICD-10-CM

## 2021-02-25 DIAGNOSIS — N93.9 ABNORMAL UTERINE BLEEDING (AUB): Primary | ICD-10-CM

## 2021-02-25 PROCEDURE — 99213 OFFICE O/P EST LOW 20 MIN: CPT | Mod: TEL | Performed by: OBSTETRICS & GYNECOLOGY

## 2021-02-25 RX ORDER — MULTIVITAMIN WITH IRON
1 TABLET ORAL DAILY
COMMUNITY

## 2021-02-25 RX ORDER — ETONOGESTREL AND ETHINYL ESTRADIOL VAGINAL RING .015; .12 MG/D; MG/D
1 RING VAGINAL
Qty: 3 EACH | Refills: 4 | Status: SHIPPED | OUTPATIENT
Start: 2021-02-25 | End: 2021-07-26

## 2021-02-25 NOTE — PROGRESS NOTES
Pattie is a 35 year old who is being evaluated via a billable telephone visit.      What phone number would you like to be contacted at? [phone]  How would you like to obtain your AVS? Janis    1. Abnormal uterine bleeding (AUB)  Recommended medical management of her symptoms at this time.  In reviewing contraceptive options, we are fairly limited.  IUD would be a great option for her, but she had a negative experience with that in the past.  Has had headaches with oral contraceptives, but not with the NuvaRing.  We discussed that in general we avoid estrogen-containing methods for patient to get headaches on a previous estrogen-containing method due to concern for possible increased stroke risk.  However, the fact that she was on NuvaRing for descended period of time without any symptoms is reassuring.  At this point she would like to try the NuvaRing again.  Prescription provided.  Encouraged her to follow-up promptly if she develops any headaches.  - etonogestrel-ethinyl estradiol (NUVARING) 0.12-0.015 MG/24HR vaginal ring; Place 1 each vaginally every 28 days  Dispense: 3 each; Refill: 4    2. Adenomyosis  Reviewed imaging findings concerning for adenomyosis.  Explained what this is and that this is definitively diagnosed only on a pathology exam after hysterectomy.  She is not interested in surgical management at this point.  We will see how the NuvaRing helps manage the symptoms as well.  - etonogestrel-ethinyl estradiol (NUVARING) 0.12-0.015 MG/24HR vaginal ring; Place 1 each vaginally every 28 days  Dispense: 3 each; Refill: 4    Follow-up if worsening headaches or inadequate management on current regimen.  Otherwise, routine follow-up in 1 year.    Subjective   Pattie is a 35 year old who presents for the following health issues AUB    HPI   Pattie is doing well since last visit.  Got first IV iron on Tuesday and has the rest scheduled.  Is hopeful this will help improve symptoms.    Bleeding still  "irregular.  Open to starting contraceptive.  Discussed her history of on birth control.  Did have HA on oral contractive pills.  Did not have HA on NuvaRing for years.  Tried IUD but it was \"the worst experience of my life\" and doesn't want to do that again.  It was \"incredibly painful.\"  Periods slowed or stopped, but had cramping for essentially a year until she got it removed.          Objective           Vitals:  No vitals were obtained today due to virtual visit.    Physical Exam   healthy, alert and no distress  PSYCH: Alert and oriented times 3; coherent speech, normal   rate and volume, able to articulate logical thoughts, able   to abstract reason, no tangential thoughts, no hallucinations   or delusions  Her affect is normal  RESP: No cough, no audible wheezing, able to talk in full sentences  Remainder of exam unable to be completed due to telephone visits    -------------  Gynecological Ultrasound Report  Pelvic U/S - Transvaginal   [facility]  Referring Provider: Dr. Nina Lantigua  Sonographer:  Martina Pablo RDMS  Indication: Bleeding/Menses- Dysfunctional uterine bleeding (DUB) and Abnormal uterine bleeding (AUB)  LMP: No LMP recorded.     Gynecological Ultrasonography:   Uterus: anteverted. Contour is heterogeneous with adenomyosis appearance throughout entire myometrium, cystic area with septation = 1.1x 0.5x 0.4cm  Size: 8.9 x 6.5 x 5.6 cm  Endometrium: Thickness Total 14.8 mm     Right Ovary: 3.0 x 3.0 x 2.0 cm. Wnl  Left Ovary: 2.6 x 1.8 x 1.1 cm. Wnl  Cul de Sac Free Fluid: No free fluid     Impression:      The uterus and ovaries were visualized and no abnormalities were seen.  There is a small fluid collection within the myometrium, clinical significance is uncertain.     Tali Hackett MD    -----------    Phone call duration: 22 minutes    "

## 2021-02-26 ENCOUNTER — INFUSION THERAPY VISIT (OUTPATIENT)
Dept: INFUSION THERAPY | Facility: CLINIC | Age: 36
End: 2021-02-26
Attending: OBSTETRICS & GYNECOLOGY
Payer: COMMERCIAL

## 2021-02-26 VITALS
DIASTOLIC BLOOD PRESSURE: 85 MMHG | HEART RATE: 78 BPM | SYSTOLIC BLOOD PRESSURE: 125 MMHG | OXYGEN SATURATION: 97 % | TEMPERATURE: 98 F

## 2021-02-26 DIAGNOSIS — D50.0 IRON DEFICIENCY ANEMIA DUE TO CHRONIC BLOOD LOSS: Primary | ICD-10-CM

## 2021-02-26 DIAGNOSIS — Z34.00 SUPERVISION OF NORMAL FIRST PREGNANCY: ICD-10-CM

## 2021-02-26 PROCEDURE — 258N000003 HC RX IP 258 OP 636: Performed by: OBSTETRICS & GYNECOLOGY

## 2021-02-26 PROCEDURE — 250N000011 HC RX IP 250 OP 636: Performed by: OBSTETRICS & GYNECOLOGY

## 2021-02-26 PROCEDURE — 96365 THER/PROPH/DIAG IV INF INIT: CPT

## 2021-02-26 RX ORDER — HEPARIN SODIUM (PORCINE) LOCK FLUSH IV SOLN 100 UNIT/ML 100 UNIT/ML
5 SOLUTION INTRAVENOUS
Status: CANCELLED | OUTPATIENT
Start: 2021-02-27

## 2021-02-26 RX ORDER — HEPARIN SODIUM,PORCINE 10 UNIT/ML
5 VIAL (ML) INTRAVENOUS
Status: CANCELLED | OUTPATIENT
Start: 2021-02-27

## 2021-02-26 RX ADMIN — IRON SUCROSE 200 MG: 20 INJECTION, SOLUTION INTRAVENOUS at 12:26

## 2021-02-26 NOTE — PROGRESS NOTES
Nursing Note  Aure Kolb presents today [facility] for:   Chief Complaint   Patient presents with     Infusion     VENOFER     During [facility]'s [facility] appointment, orders from Dr. Lantigua were completed.  Frequency: today is dose 2of 5 total    Progress note:  Patient identification verified by name and date of birth.  Assessment completed.  Vitals recorded in Doc Flowsheets.  Patient was provided with education regarding medication/procedure and possible side effects.  Patient verbalized understanding.     present during visit today: Not Applicable.    Treatment Conditions: not applicable.     Pt requesting to receive DtaP immunization sometime while here for iron infusions so that she doesn't have to make a separate appt. Writer checked with infusion pharmacist who confirms we do have vaccine available. In Basket message sent to Dr. Lantigua to please order vaccine for next iron appt.     Premedications: were not ordered.    Drug Waste Record: No    Infusion length and rate:  infusion given over approximately 15 minutes  480 ml/hr.    Labs: were not ordered for this appointment.    Vascular access: peripheral IV placed today.    Is the next appt scheduled? yes  Asymptomatic COVID test completed? Declined (done last week)    Post Infusion Assessment:  Patient tolerated infusion without incident.     Discharge Plan:  Pt observed 15 mins post infusion per her preference.     Follow up plan of care with: ongoing infusions at [facility]. and primary care provider,  Discharge instructions were reviewed with patient.  Patient/representative verbalized understanding of discharge instructions and all questions answered.  Patient discharged from [facility] in stable condition.    Gisel Ivy RN    Administrations This Visit     iron sucrose (VENOFER) 200 mg in sodium chloride 0.9 % 120  mL intermittent infusion     Admin Date  02/26/2021 Action  New Bag Dose  200 mg Rate  480 mL/hr Route  Intravenous Administered By  Gisel Ivy, RN                /85   Pulse 78   Temp 98  F (36.7  C) (Oral)   SpO2 97%

## 2021-02-26 NOTE — LETTER
2/26/2021         RE: Aure Kolb  [address]  [place] [address]        Dear Colleague,    Thank you for referring your patient, Aure Kolb, to the [facility]. Please see a copy of my visit note below.    Nursing Note  Aure Kolb presents [facility] for:   Chief Complaint   Patient presents with     Infusion     VENOFER     During [facility]'s [facility] appointment, orders from Dr. Lantigua were completed.  Frequency: today is dose 2of 5 total    Progress note:  Patient identification verified by name and date of birth.  Assessment completed.  Vitals recorded in Doc Flowsheets.  Patient was provided with education regarding medication/procedure and possible side effects.  Patient verbalized understanding.     present during visit today: Not Applicable.    Treatment Conditions: not applicable.     Pt requesting to receive DtaP immunization sometime while here for iron infusions so that she doesn't have to make a separate appt. Writer checked with infusion pharmacist who confirms we do have vaccine available. In Basket message sent to Dr. Lantigua to please order vaccine for next iron appt.     Premedications: were not ordered.    Drug Waste Record: No    Infusion length and rate:  infusion given over approximately 15 minutes  480 ml/hr.    Labs: were not ordered for this appointment.    Vascular access: peripheral IV placed today.    Is the next appt scheduled? yes  Asymptomatic COVID test completed? Declined (done last week)    Post Infusion Assessment:  Patient tolerated infusion without incident.     Discharge Plan:  Pt observed 15 mins post infusion per her preference.     Follow up plan of care with: ongoing infusions at [facility]. and primary care provider,  Discharge instructions were reviewed with patient.  Patient/representative  verbalized understanding of discharge instructions and all questions answered.  Patient discharged from [facility] in stable condition.    Gisel Ivy, ARSEN    Administrations This Visit     iron sucrose (VENOFER) 200 mg in sodium chloride 0.9 % 120 mL intermittent infusion     Admin Date  02/26/2021 Action  New Bag Dose  200 mg Rate  480 mL/hr Route  Intravenous Administered By  Gisel Ivy, RN                /85   Pulse 78   Temp 98  F (36.7  C) (Oral)   SpO2 97%           Again, thank you for allowing me to participate in the care of your patient.        Sincerely,        [facility]

## 2021-02-28 ENCOUNTER — INFUSION THERAPY VISIT (OUTPATIENT)
Dept: INFUSION THERAPY | Facility: CLINIC | Age: 36
End: 2021-02-28
Attending: OBSTETRICS & GYNECOLOGY
Payer: COMMERCIAL

## 2021-02-28 VITALS
SYSTOLIC BLOOD PRESSURE: 114 MMHG | DIASTOLIC BLOOD PRESSURE: 55 MMHG | RESPIRATION RATE: 16 BRPM | OXYGEN SATURATION: 98 % | TEMPERATURE: 98.1 F

## 2021-02-28 DIAGNOSIS — Z34.00 SUPERVISION OF NORMAL FIRST PREGNANCY: Primary | ICD-10-CM

## 2021-02-28 DIAGNOSIS — D50.0 IRON DEFICIENCY ANEMIA DUE TO CHRONIC BLOOD LOSS: ICD-10-CM

## 2021-02-28 PROCEDURE — 258N000003 HC RX IP 258 OP 636: Performed by: OBSTETRICS & GYNECOLOGY

## 2021-02-28 PROCEDURE — 90471 IMMUNIZATION ADMIN: CPT | Mod: XS | Performed by: OBSTETRICS & GYNECOLOGY

## 2021-02-28 PROCEDURE — 250N000011 HC RX IP 250 OP 636: Performed by: OBSTETRICS & GYNECOLOGY

## 2021-02-28 PROCEDURE — 96365 THER/PROPH/DIAG IV INF INIT: CPT

## 2021-02-28 PROCEDURE — 90715 TDAP VACCINE 7 YRS/> IM: CPT | Performed by: OBSTETRICS & GYNECOLOGY

## 2021-02-28 RX ORDER — HEPARIN SODIUM (PORCINE) LOCK FLUSH IV SOLN 100 UNIT/ML 100 UNIT/ML
5 SOLUTION INTRAVENOUS
Status: CANCELLED | OUTPATIENT
Start: 2021-03-02

## 2021-02-28 RX ORDER — HEPARIN SODIUM,PORCINE 10 UNIT/ML
5 VIAL (ML) INTRAVENOUS
Status: CANCELLED | OUTPATIENT
Start: 2021-03-02

## 2021-02-28 RX ADMIN — TETANUS TOXOID, REDUCED DIPHTHERIA TOXOID AND ACELLULAR PERTUSSIS VACCINE, ADSORBED 0.5 ML: 5; 2.5; 8; 8; 2.5 SUSPENSION INTRAMUSCULAR at 11:48

## 2021-02-28 RX ADMIN — IRON SUCROSE 200 MG: 20 INJECTION, SOLUTION INTRAVENOUS at 11:28

## 2021-02-28 NOTE — LETTER
2/28/2021         RE: Aure Kolb  [address]  [place] [address]        Dear Colleague,    Thank you for referring your patient, Aure Kolb, to the [facility]. Please see a copy of my visit note below.    Nursing Note  Aure Kolb presents [facility] for:   Chief Complaint   Patient presents with     Infusion     iron sucrose (VENOFER)     Imm/Inj     TDAP VACCINE (Adacel, Boostrix)      During [facility]'s [facility] appointment, orders from Dr. Lantigua were completed.  Frequency: today is dose 3 of 5 total    Progress note:  Patient identification verified by name and date of birth.  Assessment completed.  Vitals recorded in Doc Flowsheets.  Patient was provided with education regarding medication/procedure and possible side effects.  Patient verbalized understanding.     present during visit today: Not Applicable.    Treatment Conditions: not applicable.     Pt requesting to receive DtaP immunization given    Premedications: were not ordered.    Drug Waste Record: No    Infusion length and rate:  infusion given over approximately 15 minutes  480 ml/hr.    Labs: were not ordered for this appointment.    Vascular access: peripheral IV placed today.    Is the next appt scheduled? yes  Asymptomatic COVID test completed? Declined (done last week)    Post Infusion Assessment:  Patient tolerated infusion without incident.       Follow up plan of care with: ongoing infusions at [facility]. and primary care provider,  Discharge instructions were reviewed with patient.  Patient/representative verbalized understanding of discharge instructions and all questions answered.  Patient discharged from [facility] in stable condition.      Bonita Horn RN    Administrations This Visit     iron sucrose (VENOFER) 200 mg in  sodium chloride 0.9 % 120 mL intermittent infusion     Admin Date  02/28/2021 Action  New Bag Dose  200 mg Route  Intravenous Administered By  Bonita Horn RN          Tdap (tetanus-diptheria-acell pertussis) (BOOSTRIX) injection 0.5 mL     Admin Date  02/28/2021 Action  Given Dose  0.5 mL Route  Intramuscular Administered By  Bonita Horn RN                /55   Temp 98.1  F (36.7  C) (Oral)   Resp 16   SpO2 98%           Again, thank you for allowing me to participate in the care of your patient.        Sincerely,        [facility]

## 2021-02-28 NOTE — PROGRESS NOTES
Nursing Note  Aure Kolb presents [facility] for:   Chief Complaint   Patient presents with     Infusion     iron sucrose (VENOFER)     Imm/Inj     TDAP VACCINE (Adacel, Boostrix)      During [facility]'s [facility] appointment, orders from Dr. Lantigua were completed.  Frequency: today is dose 3 of 5 total    Progress note:  Patient identification verified by name and date of birth.  Assessment completed.  Vitals recorded in Doc Flowsheets.  Patient was provided with education regarding medication/procedure and possible side effects.  Patient verbalized understanding.     present during visit today: Not Applicable.    Treatment Conditions: not applicable.     Pt requesting to receive DtaP immunization given    Premedications: were not ordered.    Drug Waste Record: No    Infusion length and rate:  infusion given over approximately 15 minutes  480 ml/hr.    Labs: were not ordered for this appointment.    Vascular access: peripheral IV placed today.    Is the next appt scheduled? yes  Asymptomatic COVID test completed? Declined (done last week)    Post Infusion Assessment:  Patient tolerated infusion without incident.       Follow up plan of care with: ongoing infusions at [facility]. and primary care provider,  Discharge instructions were reviewed with patient.  Patient/representative verbalized understanding of discharge instructions and all questions answered.  Patient discharged [facility] in stable condition.      Bonita Horn RN    Administrations This Visit     iron sucrose (VENOFER) 200 mg in sodium chloride 0.9 % 120 mL intermittent infusion     Admin Date  02/28/2021 Action  New Bag Dose  200 mg Route  Intravenous Administered By  Bonita Horn RN          Tdap (tetanus-diptheria-acell pertussis) (BOOSTRIX) injection 0.5 mL     Admin Date  02/28/2021  Action  Given Dose  0.5 mL Route  Intramuscular Administered By  Bonita Horn RN                /55   Temp 98.1  F (36.7  C) (Oral)   Resp 16   SpO2 98%

## 2021-03-02 ENCOUNTER — INFUSION THERAPY VISIT (OUTPATIENT)
Dept: INFUSION THERAPY | Facility: CLINIC | Age: 36
End: 2021-03-02
Attending: OBSTETRICS & GYNECOLOGY
Payer: COMMERCIAL

## 2021-03-02 VITALS
SYSTOLIC BLOOD PRESSURE: 109 MMHG | TEMPERATURE: 98.4 F | OXYGEN SATURATION: 98 % | RESPIRATION RATE: 16 BRPM | HEART RATE: 71 BPM | DIASTOLIC BLOOD PRESSURE: 72 MMHG

## 2021-03-02 DIAGNOSIS — Z34.00 SUPERVISION OF NORMAL FIRST PREGNANCY: Primary | ICD-10-CM

## 2021-03-02 DIAGNOSIS — D50.0 IRON DEFICIENCY ANEMIA DUE TO CHRONIC BLOOD LOSS: ICD-10-CM

## 2021-03-02 PROCEDURE — 258N000003 HC RX IP 258 OP 636: Performed by: OBSTETRICS & GYNECOLOGY

## 2021-03-02 PROCEDURE — 250N000011 HC RX IP 250 OP 636: Performed by: OBSTETRICS & GYNECOLOGY

## 2021-03-02 PROCEDURE — 96365 THER/PROPH/DIAG IV INF INIT: CPT

## 2021-03-02 RX ORDER — HEPARIN SODIUM (PORCINE) LOCK FLUSH IV SOLN 100 UNIT/ML 100 UNIT/ML
5 SOLUTION INTRAVENOUS
Status: CANCELLED | OUTPATIENT
Start: 2021-03-04

## 2021-03-02 RX ORDER — HEPARIN SODIUM,PORCINE 10 UNIT/ML
5 VIAL (ML) INTRAVENOUS
Status: CANCELLED | OUTPATIENT
Start: 2021-03-04

## 2021-03-02 RX ADMIN — IRON SUCROSE 200 MG: 20 INJECTION, SOLUTION INTRAVENOUS at 10:19

## 2021-03-02 NOTE — LETTER
3/2/2021         RE: Aure Kolb  [address]  [place] [address]        Dear Colleague,    Thank you for referring your patient, Aure Kolb, to the [facility]. Please see a copy of my visit note below.    Nursing Note  Aure Kolb presents [facility] for:   Chief Complaint   Patient presents with     Infusion     Venofer     During [facility] appointment, orders from Dr Lantigua were completed.  Frequency: today is dose 4 of 5 total    Progress note:  Patient identification verified by name and date of birth.  Assessment completed.  Vitals recorded in Doc Flowsheets.  Patient was provided with education regarding medication/procedure and possible side effects.  Patient verbalized understanding.     present during visit today: Not Applicable.    Treatment Conditions: Non-applicable.    Premedications: were not ordered.    Drug Waste Record: No    Infusion length and rate:  infusion given over approximately 15 minutes    Labs: were not ordered for this appointment.    Vascular access: peripheral IV placed today.    Is the next appt scheduled? Yes   Asymptomatic COVID test completed? n/a    Post Infusion Assessment:  Patient tolerated infusion without incident.     Discharge Plan:   Follow up plan of care with: ongoing infusions at [facility].  Discharge instructions were reviewed with patient.  Patient/representative verbalized understanding of discharge instructions and all questions answered.  Patient discharged from [facility] in stable condition.    ISABELLA COLEMAN, ARSEN    Administrations This Visit     iron sucrose (VENOFER) 200 mg in sodium chloride 0.9 % 120 mL intermittent infusion     Admin Date  03/02/2021 Action  New Bag Dose  200 mg Route  Intravenous Administered By  Isabella Coleman, RN                 /69   Pulse 75   Temp 98.4  F (36.9  C) (Oral)   Resp 16   SpO2 98%           Again, thank you for allowing me to participate in the care of your patient.        Sincerely,        [facility]

## 2021-03-02 NOTE — PROGRESS NOTES
Nursing Note  Aure Kolb presents [facility] for:   Chief Complaint   Patient presents with     Infusion     Venofer     During [facility] appointment, orders from Dr Lantigua were completed.  Frequency: today is dose 4 of 5 total    Progress note:  Patient identification verified by name and date of birth.  Assessment completed.  Vitals recorded in Doc Flowsheets.  Patient was provided with education regarding medication/procedure and possible side effects.  Patient verbalized understanding.     present during visit today: Not Applicable.    Treatment Conditions: Non-applicable.    Premedications: were not ordered.    Drug Waste Record: No    Infusion length and rate:  infusion given over approximately 15 minutes    Labs: were not ordered for this appointment.    Vascular access: peripheral IV placed today.    Is the next appt scheduled? Yes   Asymptomatic COVID test completed? n/a    Post Infusion Assessment:  Patient tolerated infusion without incident.     Discharge Plan:   Follow up plan of care with: ongoing infusions at [facility].  Discharge instructions were reviewed with patient.  Patient/representative verbalized understanding of discharge instructions and all questions answered.  Patient discharged [facility] in stable condition.    ISABELLA COLEMAN, ARSEN    Administrations This Visit     iron sucrose (VENOFER) 200 mg in sodium chloride 0.9 % 120 mL intermittent infusion     Admin Date  03/02/2021 Action  New Bag Dose  200 mg Route  Intravenous Administered By  Isabella Coleman RN                /69   Pulse 75   Temp 98.4  F (36.9  C) (Oral)   Resp 16   SpO2 98%

## 2021-03-04 ENCOUNTER — INFUSION THERAPY VISIT (OUTPATIENT)
Dept: INFUSION THERAPY | Facility: CLINIC | Age: 36
End: 2021-03-04
Attending: OBSTETRICS & GYNECOLOGY
Payer: COMMERCIAL

## 2021-03-04 VITALS
HEART RATE: 65 BPM | RESPIRATION RATE: 16 BRPM | DIASTOLIC BLOOD PRESSURE: 66 MMHG | OXYGEN SATURATION: 99 % | SYSTOLIC BLOOD PRESSURE: 101 MMHG | TEMPERATURE: 98.3 F

## 2021-03-04 DIAGNOSIS — Z34.00 SUPERVISION OF NORMAL FIRST PREGNANCY: Primary | ICD-10-CM

## 2021-03-04 DIAGNOSIS — D50.0 IRON DEFICIENCY ANEMIA DUE TO CHRONIC BLOOD LOSS: ICD-10-CM

## 2021-03-04 PROCEDURE — 96365 THER/PROPH/DIAG IV INF INIT: CPT

## 2021-03-04 PROCEDURE — 250N000011 HC RX IP 250 OP 636: Performed by: OBSTETRICS & GYNECOLOGY

## 2021-03-04 PROCEDURE — 258N000003 HC RX IP 258 OP 636: Performed by: OBSTETRICS & GYNECOLOGY

## 2021-03-04 RX ORDER — HEPARIN SODIUM,PORCINE 10 UNIT/ML
5 VIAL (ML) INTRAVENOUS
Status: CANCELLED | OUTPATIENT
Start: 2021-03-04

## 2021-03-04 RX ORDER — HEPARIN SODIUM (PORCINE) LOCK FLUSH IV SOLN 100 UNIT/ML 100 UNIT/ML
5 SOLUTION INTRAVENOUS
Status: CANCELLED | OUTPATIENT
Start: 2021-03-04

## 2021-03-04 RX ADMIN — IRON SUCROSE 200 MG: 20 INJECTION, SOLUTION INTRAVENOUS at 12:21

## 2021-03-04 NOTE — LETTER
3/4/2021         RE: Aure Kolb  [address]  [place] [address]        Dear Colleague,    Thank you for referring your patient, Aure Kolb, to the [facility]REAT[facility]. Please see a copy of my visit note below.    Nursing Note  Aure Kolb presents [facility] for:   Chief Complaint   Patient presents with     Infusion     Venofer     During [facility] appointment, orders from Dr. Nina Lantigua were completed.  Frequency: for 5 doses, at least 48 hours apart. Today is patient's final dose.    Progress note:  Patient identification verified by name and date of birth.  Assessment completed.  Vitals recorded in Doc Flowsheets.  Patient was provided with education regarding medication/procedure and possible side effects.  Patient verbalized understanding.     present during visit today: Not Applicable.    Treatment Conditions: Non-applicable.    Premedications: were not ordered.    Drug Waste Record: No    Infusion length and rate:  infusion given over approximately 15 minutes.     Labs: were not ordered for this appointment.    Vascular access: peripheral IV placed today.    Post Infusion Assessment:  Patient tolerated infusion without incident.     Discharge Plan:   Follow up plan of care with: ordering provider as scheduled. and after visit summary declined by patient  Discharge instructions were reviewed with patient.  Patient/representative verbalized understanding of discharge instructions and all questions answered.  Patient discharged from [facility] in stable condition.    Dayana Hutchinson RN       Administrations This Visit     iron sucrose (VENOFER) 200 mg in sodium chloride 0.9 % 120 mL intermittent infusion     Admin Date  03/04/2021 Action  New Bag Dose  200 mg Route  Intravenous Administered By  Dayana Hutchinson, ARSEN                 /66   Pulse 65   Temp 98.3  F (36.8  C) (Oral)   Resp 16   SpO2 99%         Again, thank you for allowing me to participate in the care of your patient.        Sincerely,        [facility]

## 2021-03-04 NOTE — PROGRESS NOTES
Nursing Note  Aure Kolb presents today to [facility] for:   Chief Complaint   Patient presents with     Infusion     Venofer     During [facility] appointment, orders from Dr. Nina Lantigua were completed.  Frequency: for 5 doses, at least 48 hours apart. Today is patient's final dose.    Progress note:  Patient identification verified by name and date of birth.  Assessment completed.  Vitals recorded in Doc Flowsheets.  Patient was provided with education regarding medication/procedure and possible side effects.  Patient verbalized understanding.     present during visit today: Not Applicable.    Treatment Conditions: Non-applicable.    Premedications: were not ordered.    Drug Waste Record: No    Infusion length and rate:  infusion given over approximately 15 minutes.     Labs: were not ordered for this appointment.    Vascular access: peripheral IV placed today.    Post Infusion Assessment:  Patient tolerated infusion without incident.     Discharge Plan:   Follow up plan of care with: ordering provider as scheduled. and after visit summary declined by patient  Discharge instructions were reviewed with patient.  Patient/representative verbalized understanding of discharge instructions and all questions answered.  [facility] discharged [facility] in stable condition.    Dayana Hutchinson RN       Administrations This Visit     iron sucrose (VENOFER) 200 mg in sodium chloride 0.9 % 120 mL intermittent infusion     Admin Date  03/04/2021 Action  New Bag Dose  200 mg Route  Intravenous Administered By  Dayana Hutchinson, ARSEN                /66   Pulse 65   Temp 98.3  F (36.8  C) (Oral)   Resp 16   SpO2 99%

## 2021-03-19 ENCOUNTER — MYC MEDICAL ADVICE (OUTPATIENT)
Dept: OBGYN | Facility: CLINIC | Age: 36
End: 2021-03-19

## 2021-03-19 DIAGNOSIS — D50.0 IRON DEFICIENCY ANEMIA DUE TO CHRONIC BLOOD LOSS: Primary | ICD-10-CM

## 2021-03-19 NOTE — TELEPHONE ENCOUNTER
Patient was seen on 2/25/2021 for AUB. Ferritin and iron panel low, so patient has been doing a course of iron infusions. She would like to know when she should get her levels checked again. Please advise. Neena Albert RN

## 2021-04-01 DIAGNOSIS — D50.0 IRON DEFICIENCY ANEMIA DUE TO CHRONIC BLOOD LOSS: ICD-10-CM

## 2021-04-01 LAB
ERYTHROCYTE [DISTWIDTH] IN BLOOD BY AUTOMATED COUNT: 17.4 % (ref 10–15)
FERRITIN SERPL-MCNC: 121 NG/ML (ref 12–150)
HCT VFR BLD AUTO: 37 % (ref 35–47)
HGB BLD-MCNC: 12.2 G/DL (ref 11.7–15.7)
IRON SATN MFR SERPL: 20 % (ref 15–46)
IRON SERPL-MCNC: 70 UG/DL (ref 35–180)
MCH RBC QN AUTO: 28.4 PG (ref 26.5–33)
MCHC RBC AUTO-ENTMCNC: 33 G/DL (ref 31.5–36.5)
MCV RBC AUTO: 86 FL (ref 78–100)
PLATELET # BLD AUTO: 291 10E9/L (ref 150–450)
RBC # BLD AUTO: 4.3 10E12/L (ref 3.8–5.2)
TIBC SERPL-MCNC: 355 UG/DL (ref 240–430)
WBC # BLD AUTO: 7.3 10E9/L (ref 4–11)

## 2021-04-01 PROCEDURE — 83550 IRON BINDING TEST: CPT | Performed by: OBSTETRICS & GYNECOLOGY

## 2021-04-01 PROCEDURE — 82728 ASSAY OF FERRITIN: CPT | Performed by: OBSTETRICS & GYNECOLOGY

## 2021-04-01 PROCEDURE — 36415 COLL VENOUS BLD VENIPUNCTURE: CPT | Performed by: OBSTETRICS & GYNECOLOGY

## 2021-04-01 PROCEDURE — 83540 ASSAY OF IRON: CPT | Performed by: OBSTETRICS & GYNECOLOGY

## 2021-04-01 PROCEDURE — 85027 COMPLETE CBC AUTOMATED: CPT | Performed by: OBSTETRICS & GYNECOLOGY

## 2021-05-14 ENCOUNTER — VIRTUAL VISIT (OUTPATIENT)
Dept: OBGYN | Facility: CLINIC | Age: 36
End: 2021-05-14
Payer: COMMERCIAL

## 2021-05-14 DIAGNOSIS — N80.03 ADENOMYOSIS: Primary | ICD-10-CM

## 2021-05-14 PROCEDURE — 99214 OFFICE O/P EST MOD 30 MIN: CPT | Mod: TEL | Performed by: OBSTETRICS & GYNECOLOGY

## 2021-05-14 NOTE — PROGRESS NOTES
Pattie is a 35 year old who is being evaluated via a billable telephone visit.      What phone number would you like to be contacted at? [phone]  How would you like to obtain your AVS? MyChart    1. Adenomyosis  We did discuss option of hysterectomy.  Also revisited the option of Mirena IUD, although she is not at all interested in this at this point.  Confirmed that she would be comfortable if she were not able to bear children in the future.  Discussed that I would suspect her hysterectomy could be completed in a minimally invasive fashion.  Not sure certain that vaginal hysterectomy will be possible given her nulliparity, would expect laparoscopic would be possible.  She does not want her ovaries removed and I am in complete agreement.  There was no evidence of ovarian pathology on her recent pelvic ultrasound.  Would recommend endometrial biopsy prior to proceeding with surgery to exclude the possibility of endometrial pathology.  Also discussed that I would need to perform a pelvic exam to confirm our route of surgery.  We reviewed 6w for full recovery.  She is concerned that she does not have that much PTO.  Discussed that many patients are able to return to work sooner if they do not have a strenuous job.  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  I would not plan for any less than a 4-week recovery as a hysterectomy is a major surgery.      Plan for an office visit for pelvic exam endometrial biopsy or surgical planning.  She is now percent certain she would like to proceed with surgery at this time, but she has met her deductible this year, so strongly considering it.    Nina Lantigua MD      Subjective   Pattie is a 35 year old who presents for the following health issues:  hysterectomy    HPI   Pattie continues to have trouble with her menstrual cycles.  She doesn't want to go back  "on birth control.  Feels \"that's how I got to this place with weird hormones.\"  States she's tired of heavy periods, bleeding for 14-18d.  Also has back pain that makes it hard to get out of bed.  Wants to be done with this.    Never completely closed the door on having children, but would be ok if she doesn't carry children if that meant she wouldn't have to deal with these cycle issues anymore.  Open to adoption, but wouldn't not want to be a single mom by choice and is currently not partnered.      Review of Systems   Constitutional, HEENT, cardiovascular, pulmonary, gi and gu systems are negative, except as otherwise noted.      Objective           Vitals:  No vitals were obtained today due to virtual visit.    Physical Exam   healthy, alert and no distress  PSYCH: Alert and oriented times 3; coherent speech, normal   rate and volume, able to articulate logical thoughts, able   to abstract reason, no tangential thoughts, no hallucinations   or delusions  Her affect is normal  RESP: No cough, no audible wheezing, able to talk in full sentences  Remainder of exam unable to be completed due to telephone visits    Reviewed ultrasound results from previous visit.        Phone call duration: 22 minutes    33 minutes spent on the date of the encounter doing chart review, review of test results, interpretation of tests, patient visit and documentation       "

## 2021-06-03 ENCOUNTER — OFFICE VISIT (OUTPATIENT)
Dept: OBGYN | Facility: CLINIC | Age: 36
End: 2021-06-03
Payer: COMMERCIAL

## 2021-06-03 VITALS
BODY MASS INDEX: 34.18 KG/M2 | DIASTOLIC BLOOD PRESSURE: 66 MMHG | HEART RATE: 73 BPM | WEIGHT: 199.1 LBS | SYSTOLIC BLOOD PRESSURE: 110 MMHG

## 2021-06-03 DIAGNOSIS — D50.0 IRON DEFICIENCY ANEMIA DUE TO CHRONIC BLOOD LOSS: ICD-10-CM

## 2021-06-03 DIAGNOSIS — N80.03 ADENOMYOSIS: Primary | ICD-10-CM

## 2021-06-03 DIAGNOSIS — N93.9 ABNORMAL UTERINE BLEEDING (AUB): ICD-10-CM

## 2021-06-03 LAB
ALBUMIN SERPL-MCNC: 3.8 G/DL (ref 3.4–5)
ALP SERPL-CCNC: 81 U/L (ref 40–150)
ALT SERPL W P-5'-P-CCNC: 32 U/L (ref 0–50)
ANION GAP SERPL CALCULATED.3IONS-SCNC: 8 MMOL/L (ref 3–14)
AST SERPL W P-5'-P-CCNC: 17 U/L (ref 0–45)
BILIRUB SERPL-MCNC: 0.2 MG/DL (ref 0.2–1.3)
BUN SERPL-MCNC: 15 MG/DL (ref 7–30)
CALCIUM SERPL-MCNC: 9.2 MG/DL (ref 8.5–10.1)
CHLORIDE SERPL-SCNC: 104 MMOL/L (ref 94–109)
CO2 SERPL-SCNC: 25 MMOL/L (ref 20–32)
CREAT SERPL-MCNC: 0.65 MG/DL (ref 0.52–1.04)
GFR SERPL CREATININE-BSD FRML MDRD: >90 ML/MIN/{1.73_M2}
GLUCOSE SERPL-MCNC: 164 MG/DL (ref 70–99)
HCG UR QL: NEGATIVE
POTASSIUM SERPL-SCNC: 3.9 MMOL/L (ref 3.4–5.3)
PROT SERPL-MCNC: 8.1 G/DL (ref 6.8–8.8)
SODIUM SERPL-SCNC: 137 MMOL/L (ref 133–144)
T4 FREE SERPL-MCNC: 1.06 NG/DL (ref 0.76–1.46)
TSH SERPL DL<=0.005 MIU/L-ACNC: 1.88 MU/L (ref 0.4–4)

## 2021-06-03 PROCEDURE — 80053 COMPREHEN METABOLIC PANEL: CPT | Performed by: OBSTETRICS & GYNECOLOGY

## 2021-06-03 PROCEDURE — 58100 BIOPSY OF UTERUS LINING: CPT | Performed by: OBSTETRICS & GYNECOLOGY

## 2021-06-03 PROCEDURE — 84439 ASSAY OF FREE THYROXINE: CPT | Performed by: OBSTETRICS & GYNECOLOGY

## 2021-06-03 PROCEDURE — 99214 OFFICE O/P EST MOD 30 MIN: CPT | Mod: 25 | Performed by: OBSTETRICS & GYNECOLOGY

## 2021-06-03 PROCEDURE — 81025 URINE PREGNANCY TEST: CPT | Performed by: OBSTETRICS & GYNECOLOGY

## 2021-06-03 PROCEDURE — 84443 ASSAY THYROID STIM HORMONE: CPT | Performed by: OBSTETRICS & GYNECOLOGY

## 2021-06-03 PROCEDURE — 36415 COLL VENOUS BLD VENIPUNCTURE: CPT | Performed by: OBSTETRICS & GYNECOLOGY

## 2021-06-03 NOTE — PROGRESS NOTES
GYN Problem Visit                                                 CC:  abnormal uterine bleeding, adenomyosis.    HPI:  Aure Kolb is a 36 year old female who presents to clinic today complaining of abnormal uterine bleeding likely secondary to adenomyosis    No new changes to her symptoms.  Has decided she is ready to move forward with hysterectomy.  Reports she just cannot deal with this bleeding any longer.  Aware of the alternatives, and is not interested at this point.  She is aware that hysterectomy would eliminate the possibility that she could carry a pregnancy in the future and is comfortable with this.  Would not want her ovaries removed.    EXAM:  not currently breastfeeding.   BMI= There is no height or weight on file to calculate BMI.  General - pleasant female in no acute distress.  Abdomen - soft, nontender, nondistended, no hepatosplenomegaly.  Pelvic - external genitalia: normal adult female without lesions or abnormalities; BUS: within normal limits; Vagina: well rugated, no discharge, no lesions; Cervix: no lesions or CMT; Uterus: anteverted, slightly boggy.  Mobile and nontender.  Week sized, mobile and nontender; Adnexae: no masses or tenderness.  There is some descent of the cervix on pelvic exam  Rectovaginal - deferred.  Musculoskeletal - no gross deformities.  Neurological - normal strength, sensation, and mental status.    Procedure: Endometrial biopsy.   Dx:  Abnormal uterine bleeding, pre-op testing  After informed consent was obtained, the cervix was cleansed with betadyne, grasped with a tenaculum.  The pipel was inserted easily and four quadrant sampling was done.  Sample was sent for pathology.    Tenaculum removed, hemostasis achieved with simple pressure, minimal bleeding. Patient tolerated procedure well.      Assessment:    Auer Kolb is a 36 year old  who presents today to discuss endometrial biopsy and hysterectomy planning.    Plan:  1. Adenomyosis  2.  Abnormal uterine bleeding (AUB)  Discussed minimally invasive options for her hysterectomy, including laparoscopic hysterectomy and vaginal hysterectomy, as well as the pros and cons of both.  Vaginal hysterectomy is the appear to me of a minimally invasive surgery, as there are no abdominal incisions, but it would potentially limit our ability to evaluate the adnexa and remove fallopian tubes.  She does not have any known risk factors for any issues and ovaries have been normal on ultrasound, but we did review that we are occasionally unable to safely visualize the adnexa or remove fallopian tubes.  Then discussed a total laparoscopic hysterectomy which would entail 3 small 5 mm incisions on the abdomen.  We will be able to perform an abdominal survey evaluate the adnexal structures and remove the fallopian tubes in their entirety.  As she does not know her family history, she is most comfortable with the surgery that would allow us to fully evaluate her pelvic organs, and perform a limited upper abdominal survey.  We discussed the risks of surgery including, but not limited to risk of anesthesia, bleeding with possibility of transfusion, infection, injury to abdominal/pelvic organs or blood vessels and possible need for second surgery for unrecognized injury.  Endometrial biopsy performed today for preoperative planning.  Patient can schedule surgery at her convenience.  Case request placed.  - Comprehensive metabolic panel (BMP + Alb, Alk Phos, ALT, AST, Total. Bili, TP)  - **TSH with free T4 reflex FUTURE 6mo  - T4, free  - ENDOMETRIAL BIOPSY W/O CERVICAL DILATION  - Surgical pathology exam  - Case Request: HYSTERECTOMY, TOTAL, LAPAROSCOPIC, WITH BILATERAL SALPINGECTOMY    3. Iron deficiency anemia due to chronic blood loss  Is status post IV iron.  Doing well symptomatically.  We will plan to recheck labs closer to the time of her hysterectomy.  - ENDOMETRIAL BIOPSY W/O CERVICAL DILATION  - Surgical pathology  exam  - Case Request: HYSTERECTOMY, TOTAL, LAPAROSCOPIC, WITH BILATERAL SALPINGECTOMY          Nina Lantigua MD

## 2021-06-04 PROCEDURE — 88305 TISSUE EXAM BY PATHOLOGIST: CPT | Performed by: PATHOLOGY

## 2021-06-09 ENCOUNTER — TELEPHONE (OUTPATIENT)
Dept: OBGYN | Facility: CLINIC | Age: 36
End: 2021-06-09

## 2021-06-09 PROBLEM — N93.9 ABNORMAL UTERINE BLEEDING (AUB): Status: ACTIVE | Noted: 2021-06-09

## 2021-06-09 PROBLEM — N80.03 ADENOMYOSIS: Status: ACTIVE | Noted: 2021-06-09

## 2021-06-09 LAB — COPATH REPORT: NORMAL

## 2021-06-09 NOTE — TELEPHONE ENCOUNTER
Type of surgery: gyn  Location of surgery: [facility] [place]  Date and time of surgery: 08/09/21 9:30AM  Surgeon: Delmy Lantigua  Pre-Op Appt Date: 07/26/21 Nina Avalos  Post-Op Appt Date: 08/24/21 (will schedule 6 wk post op when provider schedules are posted for Sept.    Packet sent out: Yes  Pre-cert/Authorization completed:  Not Applicable  Date: 06/09/21     Colette

## 2021-06-14 DIAGNOSIS — Z11.59 ENCOUNTER FOR SCREENING FOR OTHER VIRAL DISEASES: ICD-10-CM

## 2021-06-15 ENCOUNTER — E-VISIT (OUTPATIENT)
Dept: OBGYN | Facility: CLINIC | Age: 36
End: 2021-06-15
Payer: COMMERCIAL

## 2021-06-15 DIAGNOSIS — R53.83 OTHER FATIGUE: Primary | ICD-10-CM

## 2021-06-15 DIAGNOSIS — N93.9 ABNORMAL UTERINE BLEEDING (AUB): ICD-10-CM

## 2021-06-15 DIAGNOSIS — D50.0 IRON DEFICIENCY ANEMIA DUE TO CHRONIC BLOOD LOSS: ICD-10-CM

## 2021-06-15 PROCEDURE — 99421 OL DIG E/M SVC 5-10 MIN: CPT | Performed by: OBSTETRICS & GYNECOLOGY

## 2021-07-09 RX ORDER — CEFAZOLIN SODIUM 2 G/100ML
2 INJECTION, SOLUTION INTRAVENOUS
Status: CANCELLED | OUTPATIENT
Start: 2021-08-09

## 2021-07-09 RX ORDER — CEFAZOLIN SODIUM 2 G/100ML
2 INJECTION, SOLUTION INTRAVENOUS SEE ADMIN INSTRUCTIONS
Status: CANCELLED | OUTPATIENT
Start: 2021-08-09

## 2021-07-21 NOTE — PROGRESS NOTES
[facility]  [address]  [facility] [address]  Phone: [phone]  Primary Provider: Nina Lantigua  Pre-op Performing Provider: NINA MURRY      PREOPERATIVE EVALUATION:  Today's date: 7/26/2021    Aure Kolb is a 36 year old female who presents for a preoperative evaluation.    Surgical Information:  Surgery/Procedure: HYSTERECTOMY, TOTAL, LAPAROSCOPIC, WITH BILATERAL SALPINGECTOMY  Surgery Location: [facility]   Surgeon: Nina Lantigua MD  Surgery Date: 08/09/2021  Time of Surgery: *9:30Am  Where patient plans to recover: At home with family  Fax number for surgical facility: Note does not need to be faxed, will be available electronically in Epic.    Type of Anesthesia Anticipated: General and Smita Block    Assessment & Plan     The proposed surgical procedure is considered INTERMEDIATE risk.    Preop general physical exam       Abnormal uterine bleeding (AUB)       Adenomyosis              Risks and Recommendations:  The patient has the following additional risks and recommendations for perioperative complications:   - No identified additional risk factors other than previously addressed        RECOMMENDATION:  APPROVAL GIVEN to proceed with proposed procedure, without further diagnostic evaluation.    Review of external notes as documented above           20 minutes spent on the date of the encounter doing chart review, history and exam, documentation and further activities per the note         Subjective     HPI related to upcoming procedure: HYSTERECTOMY, TOTAL, LAPAROSCOPIC, WITH BILATERAL SALPINGECTOMY      Preop Questions 7/23/2021   1. Have you ever had a heart attack or stroke? No   2. Have you ever had surgery on your heart or blood vessels, such as a stent placement, a coronary artery bypass, or surgery on an artery in your head, neck, heart, or legs? No   3. Do you have chest pain  with activity? No   4. Do you have a history of  heart failure? No   5. Do you currently have a cold, bronchitis or symptoms of other infection? No   6. Do you have a cough, shortness of breath, or wheezing? No   7. Do you or anyone in your family have previous history of blood clots? No   8. Do you or does anyone in your family have a serious bleeding problem such as prolonged bleeding following surgeries or cuts? No   9. Have you ever had problems with anemia or been told to take iron pills? YES -  Due to blood loss anemia   10. Have you had any abnormal blood loss such as black, tarry or bloody stools, or abnormal vaginal bleeding? No   11. Have you ever had a blood transfusion? No   12. Are you willing to have a blood transfusion if it is medically needed before, during, or after your surgery? Yes   13. Have you or any of your relatives ever had problems with anesthesia? No   14. Do you have sleep apnea, excessive snoring or daytime drowsiness? No   15. Do you have any artifical heart valves or other implanted medical devices like a pacemaker, defibrillator, or continuous glucose monitor? No   16. Do you have artificial joints? No   17. Are you allergic to latex? No   18. Is there any chance that you may be pregnant? No     Health Care Directive:  Patient does not have a Health Care Directive or Living Will:     Preoperative Review of :   reviewed - no record of controlled substances prescribed.          Review of Systems  CONSTITUTIONAL: NEGATIVE for fever, chills, change in weight  INTEGUMENTARY/SKIN: NEGATIVE for worrisome rashes, moles or lesions  EYES: NEGATIVE for vision changes or irritation  ENT/MOUTH: NEGATIVE for ear, mouth and throat problems  RESP: NEGATIVE for significant cough or SOB  CV: NEGATIVE for chest pain, palpitations or peripheral edema  GI: NEGATIVE for nausea, abdominal pain, heartburn, or change in bowel habits  : NEGATIVE for frequency, dysuria, or hematuria  MUSCULOSKELETAL:  NEGATIVE for significant arthralgias or myalgia  NEURO: NEGATIVE for weakness, dizziness or paresthesias  ENDOCRINE: NEGATIVE for temperature intolerance, skin/hair changes  HEME: NEGATIVE for bleeding problems  PSYCHIATRIC: NEGATIVE for changes in mood or affect    Patient Active Problem List    Diagnosis Date Noted     Abnormal uterine bleeding (AUB) 06/09/2021     Priority: Medium     Added automatically from request for surgery [id number]       Adenomyosis 06/09/2021     Priority: Medium     Added automatically from request for surgery [id number]       Supervision of normal first pregnancy 02/26/2021     Priority: Medium     Iron deficiency anemia due to chronic blood loss 02/10/2021     Priority: Medium     Migraine aura without headache      Priority: Medium     Woozy, spacy, blurry vision, dry mouth- when weather changes (q1-2 wks, very tied to weather, no pain sx's), started since age ~25 yrs.  Sx's fine once it rains.  The only thing that works is a glass of wine.  Can't take excedrin.  Ibuprofen, coffee, exercise have not helped.  Less soy helped.  May try nightly magnesium.       Tension headache      Priority: Medium     Allergic state 04/14/2013     Priority: Medium     Overview:   Cat and dog dander.        Intermittent asthma 04/12/2012     Priority: Medium     Atopic rhinitis 05/27/2004     Priority: Medium     Overview:   Epic         Past Medical History:   Diagnosis Date     Bulging lumbar disc     dx clinically chiro- given voltaren gel     Migraine aura without headache     woozy, spacy, blurry vision, dry mouth- when weather changes, started since age ~25 yrs     Obesity      Seasonal allergic rhinitis     claritin, benadryl if really needed (nose bleeds on nasal sprays)     Tension headache      Past Surgical History:   Procedure Laterality Date     DILATION AND CURETTAGE      uterine polyps, '02 and '08     wisdom teeth  2002     Current Outpatient Medications   Medication Sig Dispense Refill      albuterol (PROAIR HFA/PROVENTIL HFA/VENTOLIN HFA) 108 (90 Base) MCG/ACT Inhaler Inhale 2 puffs into the lungs every 6 hours as needed for shortness of breath / dyspnea or wheezing 1 Inhaler 1     CALCIUM-MAGNESIUM-ZINC PO        diphenhydrAMINE (BENADRYL) 25 MG capsule Take 25 mg by mouth every 6 hours as needed for itching or allergies       etonogestrel-ethinyl estradiol (NUVARING) 0.12-0.015 MG/24HR vaginal ring Place 1 each vaginally every 28 days 3 each 4     loratadine (CLARITIN) 10 MG tablet Take 1 tablet (10 mg) by mouth daily 30 tablet 1     magnesium 250 MG tablet Take 1 tablet by mouth daily       VITAMIN D PO Take 2,000 Int'l Units by mouth         Allergies   Allergen Reactions     Amoxicillin Anaphylaxis     Benzoyl Peroxide      PN: LW Reaction: swelling     Doxycycline      PN: LW Reaction: RESPIRATORY DISTRESS     Fexofenadine Difficulty breathing     Hpv 9-Valent Recomb Vaccine [Quadrivalent Hpv, 6,11,16,18] Difficulty breathing     Minocycline      PN: LW Reaction: RESPIRATORY DISTRESS     Penicillins Rash     Sulfa Drugs Unknown and Rash     As a baby        Social History     Tobacco Use     Smoking status: Never Smoker     Smokeless tobacco: Never Used   Substance Use Topics     Alcohol use: Yes     Alcohol/week: 1.0 standard drinks     Types: 1 Standard drinks or equivalent per week     Comment: rarely     Adopted - no known fam history  History   Drug Use No         Objective     /68   Temp 98.5  F (36.9  C)   Wt 89.4 kg (197 lb 1.6 oz)   SpO2 98%   BMI 33.83 kg/m      Physical Exam    GENERAL APPEARANCE: healthy, alert and no distress     EYES: EOMI, PERRL     HENT: ear canals and TM's normal and nose and mouth without ulcers or lesions     NECK: no adenopathy, no asymmetry, masses, or scars and thyroid normal to palpation     RESP: lungs clear to auscultation - no rales, rhonchi or wheezes     CV: regular rates and rhythm, normal S1 S2, no S3 or S4 and no murmur, click or rub      ABDOMEN:  soft, nontender, no HSM or masses and bowel sounds normal     MS: extremities normal- no gross deformities noted, no evidence of inflammation in joints, FROM in all extremities.     SKIN: no suspicious lesions or rashes     NEURO: Normal strength and tone, sensory exam grossly normal, mentation intact and speech normal     PSYCH: mentation appears normal. and affect normal/bright     LYMPHATICS: No cervical adenopathy    Recent Labs   Lab Test 06/03/21  1413 04/01/21  0828 02/09/21  0954   HGB  --  12.2 11.4*   PLT  --  291 318     --   --    POTASSIUM 3.9  --   --    CR 0.65  --   --    A1C  --   --  5.6        Diagnostics:  Labs pending at this time.  Results will be reviewed when available.   No EKG required for low risk surgery (cataract, skin procedure, breast biopsy, etc).    Revised Cardiac Risk Index (RCRI):  The patient has the following serious cardiovascular risks for perioperative complications:   - No serious cardiac risks = 0 points     RCRI Interpretation: 0 points: Class I (very low risk - 0.4% complication rate)           Signed Electronically by: Nina Avalos MD  Copy of this evaluation report is provided to requesting physician.

## 2021-07-21 NOTE — H&P (VIEW-ONLY)
[facility]  [address]  [facility] [address]  Phone: [phone]  Primary Provider: Nina Lantigua  Pre-op Performing Provider: NINA MURRY      PREOPERATIVE EVALUATION:  Today's date: 7/26/2021    Aure Kolb is a 36 year old female who presents for a preoperative evaluation.    Surgical Information:  Surgery/Procedure: HYSTERECTOMY, TOTAL, LAPAROSCOPIC, WITH BILATERAL SALPINGECTOMY  Surgery Location: [facility]   Surgeon: Nina Lantigua MD  Surgery Date: 08/09/2021  Time of Surgery: *9:30Am  Where patient plans to recover: At home with family  Fax number for surgical facility: Note does not need to be faxed, will be available electronically in Epic.    Type of Anesthesia Anticipated: General and Smita Block    Assessment & Plan     The proposed surgical procedure is considered INTERMEDIATE risk.    Preop general physical exam       Abnormal uterine bleeding (AUB)       Adenomyosis              Risks and Recommendations:  The patient has the following additional risks and recommendations for perioperative complications:   - No identified additional risk factors other than previously addressed        RECOMMENDATION:  APPROVAL GIVEN to proceed with proposed procedure, without further diagnostic evaluation.    Review of external notes as documented above           20 minutes spent on the date of the encounter doing chart review, history and exam, documentation and further activities per the note         Subjective     HPI related to upcoming procedure: HYSTERECTOMY, TOTAL, LAPAROSCOPIC, WITH BILATERAL SALPINGECTOMY      Preop Questions 7/23/2021   1. Have you ever had a heart attack or stroke? No   2. Have you ever had surgery on your heart or blood vessels, such as a stent placement, a coronary artery bypass, or surgery on an artery in your head, neck, heart, or legs? No   3. Do you have chest pain  with activity? No   4. Do you have a history of  heart failure? No   5. Do you currently have a cold, bronchitis or symptoms of other infection? No   6. Do you have a cough, shortness of breath, or wheezing? No   7. Do you or anyone in your family have previous history of blood clots? No   8. Do you or does anyone in your family have a serious bleeding problem such as prolonged bleeding following surgeries or cuts? No   9. Have you ever had problems with anemia or been told to take iron pills? YES -  Due to blood loss anemia   10. Have you had any abnormal blood loss such as black, tarry or bloody stools, or abnormal vaginal bleeding? No   11. Have you ever had a blood transfusion? No   12. Are you willing to have a blood transfusion if it is medically needed before, during, or after your surgery? Yes   13. Have you or any of your relatives ever had problems with anesthesia? No   14. Do you have sleep apnea, excessive snoring or daytime drowsiness? No   15. Do you have any artifical heart valves or other implanted medical devices like a pacemaker, defibrillator, or continuous glucose monitor? No   16. Do you have artificial joints? No   17. Are you allergic to latex? No   18. Is there any chance that you may be pregnant? No     Health Care Directive:  Patient does not have a Health Care Directive or Living Will:     Preoperative Review of :   reviewed - no record of controlled substances prescribed.          Review of Systems  CONSTITUTIONAL: NEGATIVE for fever, chills, change in weight  INTEGUMENTARY/SKIN: NEGATIVE for worrisome rashes, moles or lesions  EYES: NEGATIVE for vision changes or irritation  ENT/MOUTH: NEGATIVE for ear, mouth and throat problems  RESP: NEGATIVE for significant cough or SOB  CV: NEGATIVE for chest pain, palpitations or peripheral edema  GI: NEGATIVE for nausea, abdominal pain, heartburn, or change in bowel habits  : NEGATIVE for frequency, dysuria, or hematuria  MUSCULOSKELETAL:  NEGATIVE for significant arthralgias or myalgia  NEURO: NEGATIVE for weakness, dizziness or paresthesias  ENDOCRINE: NEGATIVE for temperature intolerance, skin/hair changes  HEME: NEGATIVE for bleeding problems  PSYCHIATRIC: NEGATIVE for changes in mood or affect    Patient Active Problem List    Diagnosis Date Noted     Abnormal uterine bleeding (AUB) 06/09/2021     Priority: Medium     Added automatically from request for surgery [id number]       Adenomyosis 06/09/2021     Priority: Medium     Added automatically from request for surgery [id number]       Supervision of normal first pregnancy 02/26/2021     Priority: Medium     Iron deficiency anemia due to chronic blood loss 02/10/2021     Priority: Medium     Migraine aura without headache      Priority: Medium     Woozy, spacy, blurry vision, dry mouth- when weather changes (q1-2 wks, very tied to weather, no pain sx's), started since age ~25 yrs.  Sx's fine once it rains.  The only thing that works is a glass of wine.  Can't take excedrin.  Ibuprofen, coffee, exercise have not helped.  Less soy helped.  May try nightly magnesium.       Tension headache      Priority: Medium     Allergic state 04/14/2013     Priority: Medium     Overview:   Cat and dog dander.        Intermittent asthma 04/12/2012     Priority: Medium     Atopic rhinitis 05/27/2004     Priority: Medium     Overview:   Epic         Past Medical History:   Diagnosis Date     Bulging lumbar disc     dx clinically chiro- given voltaren gel     Migraine aura without headache     woozy, spacy, blurry vision, dry mouth- when weather changes, started since age ~25 yrs     Obesity      Seasonal allergic rhinitis     claritin, benadryl if really needed (nose bleeds on nasal sprays)     Tension headache      Past Surgical History:   Procedure Laterality Date     DILATION AND CURETTAGE      uterine polyps, '02 and '08     wisdom teeth  2002     Current Outpatient Medications   Medication Sig Dispense Refill      albuterol (PROAIR HFA/PROVENTIL HFA/VENTOLIN HFA) 108 (90 Base) MCG/ACT Inhaler Inhale 2 puffs into the lungs every 6 hours as needed for shortness of breath / dyspnea or wheezing 1 Inhaler 1     CALCIUM-MAGNESIUM-ZINC PO        diphenhydrAMINE (BENADRYL) 25 MG capsule Take 25 mg by mouth every 6 hours as needed for itching or allergies       etonogestrel-ethinyl estradiol (NUVARING) 0.12-0.015 MG/24HR vaginal ring Place 1 each vaginally every 28 days 3 each 4     loratadine (CLARITIN) 10 MG tablet Take 1 tablet (10 mg) by mouth daily 30 tablet 1     magnesium 250 MG tablet Take 1 tablet by mouth daily       VITAMIN D PO Take 2,000 Int'l Units by mouth         Allergies   Allergen Reactions     Amoxicillin Anaphylaxis     Benzoyl Peroxide      PN: LW Reaction: swelling     Doxycycline      PN: LW Reaction: RESPIRATORY DISTRESS     Fexofenadine Difficulty breathing     Hpv 9-Valent Recomb Vaccine [Quadrivalent Hpv, 6,11,16,18] Difficulty breathing     Minocycline      PN: LW Reaction: RESPIRATORY DISTRESS     Penicillins Rash     Sulfa Drugs Unknown and Rash     As a baby        Social History     Tobacco Use     Smoking status: Never Smoker     Smokeless tobacco: Never Used   Substance Use Topics     Alcohol use: Yes     Alcohol/week: 1.0 standard drinks     Types: 1 Standard drinks or equivalent per week     Comment: rarely     Adopted - no known fam history  History   Drug Use No         Objective     /68   Temp 98.5  F (36.9  C)   Wt 89.4 kg (197 lb 1.6 oz)   SpO2 98%   BMI 33.83 kg/m      Physical Exam    GENERAL APPEARANCE: healthy, alert and no distress     EYES: EOMI, PERRL     HENT: ear canals and TM's normal and nose and mouth without ulcers or lesions     NECK: no adenopathy, no asymmetry, masses, or scars and thyroid normal to palpation     RESP: lungs clear to auscultation - no rales, rhonchi or wheezes     CV: regular rates and rhythm, normal S1 S2, no S3 or S4 and no murmur, click or rub      ABDOMEN:  soft, nontender, no HSM or masses and bowel sounds normal     MS: extremities normal- no gross deformities noted, no evidence of inflammation in joints, FROM in all extremities.     SKIN: no suspicious lesions or rashes     NEURO: Normal strength and tone, sensory exam grossly normal, mentation intact and speech normal     PSYCH: mentation appears normal. and affect normal/bright     LYMPHATICS: No cervical adenopathy    Recent Labs   Lab Test 06/03/21  1413 04/01/21  0828 02/09/21  0954   HGB  --  12.2 11.4*   PLT  --  291 318     --   --    POTASSIUM 3.9  --   --    CR 0.65  --   --    A1C  --   --  5.6        Diagnostics:  Labs pending at this time.  Results will be reviewed when available.   No EKG required for low risk surgery (cataract, skin procedure, breast biopsy, etc).    Revised Cardiac Risk Index (RCRI):  The patient has the following serious cardiovascular risks for perioperative complications:   - No serious cardiac risks = 0 points     RCRI Interpretation: 0 points: Class I (very low risk - 0.4% complication rate)           Signed Electronically by: Nina Avalos MD  Copy of this evaluation report is provided to requesting physician.

## 2021-07-21 NOTE — PATIENT INSTRUCTIONS

## 2021-07-26 ENCOUNTER — OFFICE VISIT (OUTPATIENT)
Dept: FAMILY MEDICINE | Facility: CLINIC | Age: 36
End: 2021-07-26
Payer: COMMERCIAL

## 2021-07-26 VITALS
OXYGEN SATURATION: 98 % | DIASTOLIC BLOOD PRESSURE: 68 MMHG | BODY MASS INDEX: 33.83 KG/M2 | SYSTOLIC BLOOD PRESSURE: 101 MMHG | TEMPERATURE: 98.5 F | WEIGHT: 197.1 LBS

## 2021-07-26 DIAGNOSIS — Z01.818 PREOP GENERAL PHYSICAL EXAM: Primary | ICD-10-CM

## 2021-07-26 DIAGNOSIS — N93.9 ABNORMAL UTERINE BLEEDING (AUB): ICD-10-CM

## 2021-07-26 DIAGNOSIS — N80.03 ADENOMYOSIS: ICD-10-CM

## 2021-07-26 PROCEDURE — 99214 OFFICE O/P EST MOD 30 MIN: CPT | Performed by: FAMILY MEDICINE

## 2021-08-02 DIAGNOSIS — Z01.818 PRE-OP EXAM: Primary | ICD-10-CM

## 2021-08-02 DIAGNOSIS — N93.9 ABNORMAL UTERINE BLEEDING (AUB): ICD-10-CM

## 2021-08-06 ENCOUNTER — LAB (OUTPATIENT)
Dept: LAB | Facility: CLINIC | Age: 36
End: 2021-08-06
Payer: COMMERCIAL

## 2021-08-06 DIAGNOSIS — Z01.818 PRE-OP EXAM: ICD-10-CM

## 2021-08-06 DIAGNOSIS — D50.0 IRON DEFICIENCY ANEMIA DUE TO CHRONIC BLOOD LOSS: ICD-10-CM

## 2021-08-06 DIAGNOSIS — N93.9 ABNORMAL UTERINE BLEEDING (AUB): ICD-10-CM

## 2021-08-06 DIAGNOSIS — R53.83 OTHER FATIGUE: ICD-10-CM

## 2021-08-06 DIAGNOSIS — Z11.59 ENCOUNTER FOR SCREENING FOR OTHER VIRAL DISEASES: ICD-10-CM

## 2021-08-06 LAB
ABO/RH(D): NORMAL
ANTIBODY SCREEN: NEGATIVE
ERYTHROCYTE [DISTWIDTH] IN BLOOD BY AUTOMATED COUNT: 13 % (ref 10–15)
HCT VFR BLD AUTO: 35.5 % (ref 35–47)
HGB BLD-MCNC: 11.3 G/DL (ref 11.7–15.7)
MCH RBC QN AUTO: 27.3 PG (ref 26.5–33)
MCHC RBC AUTO-ENTMCNC: 31.8 G/DL (ref 31.5–36.5)
MCV RBC AUTO: 86 FL (ref 78–100)
PLATELET # BLD AUTO: 284 10E3/UL (ref 150–450)
RBC # BLD AUTO: 4.14 10E6/UL (ref 3.8–5.2)
SPECIMEN EXPIRATION DATE: NORMAL
WBC # BLD AUTO: 6.5 10E3/UL (ref 4–11)

## 2021-08-06 PROCEDURE — 86900 BLOOD TYPING SEROLOGIC ABO: CPT

## 2021-08-06 PROCEDURE — U0005 INFEC AGEN DETEC AMPLI PROBE: HCPCS

## 2021-08-06 PROCEDURE — 86850 RBC ANTIBODY SCREEN: CPT

## 2021-08-06 PROCEDURE — 36415 COLL VENOUS BLD VENIPUNCTURE: CPT

## 2021-08-06 PROCEDURE — 85027 COMPLETE CBC AUTOMATED: CPT

## 2021-08-06 PROCEDURE — U0003 INFECTIOUS AGENT DETECTION BY NUCLEIC ACID (DNA OR RNA); SEVERE ACUTE RESPIRATORY SYNDROME CORONAVIRUS 2 (SARS-COV-2) (CORONAVIRUS DISEASE [COVID-19]), AMPLIFIED PROBE TECHNIQUE, MAKING USE OF HIGH THROUGHPUT TECHNOLOGIES AS DESCRIBED BY CMS-2020-01-R: HCPCS

## 2021-08-06 PROCEDURE — 86901 BLOOD TYPING SEROLOGIC RH(D): CPT

## 2021-08-07 ENCOUNTER — NURSE TRIAGE (OUTPATIENT)
Dept: NURSING | Facility: CLINIC | Age: 36
End: 2021-08-07

## 2021-08-07 LAB — SARS-COV-2 RNA RESP QL NAA+PROBE: NEGATIVE

## 2021-08-07 NOTE — TELEPHONE ENCOUNTER
"\"I have surgery scheduled for Monday 8/9 and I am having some cramping. Can I take Ibuprofen or just Tylenol?\"   Advised only Tylenol  Ellny Bullock RN [facility]        Reason for Disposition    Caller has medication question, adult has minor symptoms, caller declines triage, AND triager answers question    Protocols used: MEDICATION QUESTION CALL-A-AH      "

## 2021-08-08 ENCOUNTER — ANESTHESIA EVENT (OUTPATIENT)
Dept: SURGERY | Facility: CLINIC | Age: 36
End: 2021-08-08
Payer: COMMERCIAL

## 2021-08-09 ENCOUNTER — HOSPITAL ENCOUNTER (OUTPATIENT)
Facility: CLINIC | Age: 36
Discharge: HOME | End: 2021-08-09
Attending: OBSTETRICS & GYNECOLOGY | Admitting: OBSTETRICS & GYNECOLOGY
Payer: COMMERCIAL

## 2021-08-09 ENCOUNTER — ANCILLARY PROCEDURE (OUTPATIENT)
Dept: ULTRASOUND IMAGING | Facility: CLINIC | Age: 36
End: 2021-08-09
Attending: ANESTHESIOLOGY
Payer: COMMERCIAL

## 2021-08-09 ENCOUNTER — ANESTHESIA (OUTPATIENT)
Dept: SURGERY | Facility: CLINIC | Age: 36
End: 2021-08-09
Payer: COMMERCIAL

## 2021-08-09 VITALS
HEIGHT: 64 IN | BODY MASS INDEX: 33.5 KG/M2 | TEMPERATURE: 97.5 F | SYSTOLIC BLOOD PRESSURE: 114 MMHG | RESPIRATION RATE: 16 BRPM | OXYGEN SATURATION: 98 % | WEIGHT: 196.21 LBS | HEART RATE: 60 BPM | DIASTOLIC BLOOD PRESSURE: 64 MMHG

## 2021-08-09 DIAGNOSIS — N93.9 ABNORMAL UTERINE BLEEDING (AUB): ICD-10-CM

## 2021-08-09 DIAGNOSIS — N80.03 ADENOMYOSIS: ICD-10-CM

## 2021-08-09 DIAGNOSIS — Z90.710 S/P HYSTERECTOMY: Primary | ICD-10-CM

## 2021-08-09 DIAGNOSIS — D50.0 IRON DEFICIENCY ANEMIA DUE TO CHRONIC BLOOD LOSS: ICD-10-CM

## 2021-08-09 LAB
GLUCOSE BLDC GLUCOMTR-MCNC: 111 MG/DL (ref 70–99)
HCG UR QL: NEGATIVE

## 2021-08-09 PROCEDURE — 272N000001 HC OR GENERAL SUPPLY STERILE: Performed by: OBSTETRICS & GYNECOLOGY

## 2021-08-09 PROCEDURE — 250N000011 HC RX IP 250 OP 636: Performed by: NURSE ANESTHETIST, CERTIFIED REGISTERED

## 2021-08-09 PROCEDURE — 250N000011 HC RX IP 250 OP 636: Performed by: ANESTHESIOLOGY

## 2021-08-09 PROCEDURE — 250N000011 HC RX IP 250 OP 636: Performed by: OBSTETRICS & GYNECOLOGY

## 2021-08-09 PROCEDURE — 250N000025 HC SEVOFLURANE, PER MIN: Performed by: OBSTETRICS & GYNECOLOGY

## 2021-08-09 PROCEDURE — 258N000003 HC RX IP 258 OP 636: Performed by: NURSE ANESTHETIST, CERTIFIED REGISTERED

## 2021-08-09 PROCEDURE — 370N000017 HC ANESTHESIA TECHNICAL FEE, PER MIN: Performed by: OBSTETRICS & GYNECOLOGY

## 2021-08-09 PROCEDURE — 250N000009 HC RX 250: Performed by: NURSE ANESTHETIST, CERTIFIED REGISTERED

## 2021-08-09 PROCEDURE — 250N000009 HC RX 250: Performed by: ANESTHESIOLOGY

## 2021-08-09 PROCEDURE — 58571 TLH W/T/O 250 G OR LESS: CPT | Mod: 80 | Performed by: OBSTETRICS & GYNECOLOGY

## 2021-08-09 PROCEDURE — 88307 TISSUE EXAM BY PATHOLOGIST: CPT | Mod: TC | Performed by: OBSTETRICS & GYNECOLOGY

## 2021-08-09 PROCEDURE — 258N000001 HC RX 258: Performed by: OBSTETRICS & GYNECOLOGY

## 2021-08-09 PROCEDURE — 250N000013 HC RX MED GY IP 250 OP 250 PS 637: Performed by: ANESTHESIOLOGY

## 2021-08-09 PROCEDURE — 360N000077 HC SURGERY LEVEL 4, PER MIN: Performed by: OBSTETRICS & GYNECOLOGY

## 2021-08-09 PROCEDURE — 36415 COLL VENOUS BLD VENIPUNCTURE: CPT | Performed by: OBSTETRICS & GYNECOLOGY

## 2021-08-09 PROCEDURE — 258N000003 HC RX IP 258 OP 636: Performed by: OBSTETRICS & GYNECOLOGY

## 2021-08-09 PROCEDURE — 250N000013 HC RX MED GY IP 250 OP 250 PS 637: Performed by: OBSTETRICS & GYNECOLOGY

## 2021-08-09 PROCEDURE — 88307 TISSUE EXAM BY PATHOLOGIST: CPT | Mod: 26 | Performed by: PATHOLOGY

## 2021-08-09 PROCEDURE — 710N000012 HC RECOVERY PHASE 2, PER MINUTE: Performed by: OBSTETRICS & GYNECOLOGY

## 2021-08-09 PROCEDURE — 250N000013 HC RX MED GY IP 250 OP 250 PS 637: Performed by: STUDENT IN AN ORGANIZED HEALTH CARE EDUCATION/TRAINING PROGRAM

## 2021-08-09 PROCEDURE — 81025 URINE PREGNANCY TEST: CPT | Performed by: OBSTETRICS & GYNECOLOGY

## 2021-08-09 PROCEDURE — 710N000010 HC RECOVERY PHASE 1, LEVEL 2, PER MIN: Performed by: OBSTETRICS & GYNECOLOGY

## 2021-08-09 PROCEDURE — 58571 TLH W/T/O 250 G OR LESS: CPT | Mod: GC | Performed by: OBSTETRICS & GYNECOLOGY

## 2021-08-09 PROCEDURE — 999N000141 HC STATISTIC PRE-PROCEDURE NURSING ASSESSMENT: Performed by: OBSTETRICS & GYNECOLOGY

## 2021-08-09 RX ORDER — OXYCODONE HYDROCHLORIDE 5 MG/1
5 TABLET ORAL EVERY 6 HOURS PRN
Qty: 10 TABLET | Refills: 0 | Status: SHIPPED | OUTPATIENT
Start: 2021-08-09 | End: 2021-08-12

## 2021-08-09 RX ORDER — LIDOCAINE HYDROCHLORIDE 20 MG/ML
INJECTION, SOLUTION INFILTRATION; PERINEURAL PRN
Status: DISCONTINUED | OUTPATIENT
Start: 2021-08-09 | End: 2021-08-09

## 2021-08-09 RX ORDER — NALOXONE HYDROCHLORIDE 0.4 MG/ML
0.2 INJECTION, SOLUTION INTRAMUSCULAR; INTRAVENOUS; SUBCUTANEOUS
Status: DISCONTINUED | OUTPATIENT
Start: 2021-08-09 | End: 2021-08-09 | Stop reason: HOSPADM

## 2021-08-09 RX ORDER — MAGNESIUM SULFATE HEPTAHYDRATE 40 MG/ML
2 INJECTION, SOLUTION INTRAVENOUS ONCE
Status: COMPLETED | OUTPATIENT
Start: 2021-08-09 | End: 2021-08-09

## 2021-08-09 RX ORDER — SODIUM CHLORIDE, SODIUM LACTATE, POTASSIUM CHLORIDE, CALCIUM CHLORIDE 600; 310; 30; 20 MG/100ML; MG/100ML; MG/100ML; MG/100ML
INJECTION, SOLUTION INTRAVENOUS CONTINUOUS PRN
Status: DISCONTINUED | OUTPATIENT
Start: 2021-08-09 | End: 2021-08-09

## 2021-08-09 RX ORDER — HYDROMORPHONE HYDROCHLORIDE 1 MG/ML
0.4 INJECTION, SOLUTION INTRAMUSCULAR; INTRAVENOUS; SUBCUTANEOUS EVERY 5 MIN PRN
Status: DISCONTINUED | OUTPATIENT
Start: 2021-08-09 | End: 2021-08-09 | Stop reason: HOSPADM

## 2021-08-09 RX ORDER — IBUPROFEN 800 MG/1
800 TABLET, FILM COATED ORAL ONCE
Status: DISCONTINUED | OUTPATIENT
Start: 2021-08-09 | End: 2021-08-09 | Stop reason: HOSPADM

## 2021-08-09 RX ORDER — FENTANYL CITRATE 50 UG/ML
25-50 INJECTION, SOLUTION INTRAMUSCULAR; INTRAVENOUS
Status: DISCONTINUED | OUTPATIENT
Start: 2021-08-09 | End: 2021-08-09 | Stop reason: HOSPADM

## 2021-08-09 RX ORDER — DEXAMETHASONE SODIUM PHOSPHATE 10 MG/ML
INJECTION, SOLUTION INTRAMUSCULAR; INTRAVENOUS PRN
Status: DISCONTINUED | OUTPATIENT
Start: 2021-08-09 | End: 2021-08-09

## 2021-08-09 RX ORDER — OXYCODONE HYDROCHLORIDE 5 MG/1
5 TABLET ORAL
Status: COMPLETED | OUTPATIENT
Start: 2021-08-09 | End: 2021-08-09

## 2021-08-09 RX ORDER — LIDOCAINE 40 MG/G
CREAM TOPICAL
Status: DISCONTINUED | OUTPATIENT
Start: 2021-08-09 | End: 2021-08-09 | Stop reason: HOSPADM

## 2021-08-09 RX ORDER — BUPIVACAINE HYDROCHLORIDE 2.5 MG/ML
INJECTION, SOLUTION EPIDURAL; INFILTRATION; INTRACAUDAL PRN
Status: DISCONTINUED | OUTPATIENT
Start: 2021-08-09 | End: 2021-08-09

## 2021-08-09 RX ORDER — IBUPROFEN 800 MG/1
800 TABLET, FILM COATED ORAL EVERY 6 HOURS PRN
Qty: 30 TABLET | Refills: 0 | Status: SHIPPED | OUTPATIENT
Start: 2021-08-09 | End: 2022-10-11

## 2021-08-09 RX ORDER — HALOPERIDOL 5 MG/ML
1 INJECTION INTRAMUSCULAR
Status: DISCONTINUED | OUTPATIENT
Start: 2021-08-09 | End: 2021-08-09 | Stop reason: HOSPADM

## 2021-08-09 RX ORDER — DEXAMETHASONE SODIUM PHOSPHATE 4 MG/ML
INJECTION, SOLUTION INTRA-ARTICULAR; INTRALESIONAL; INTRAMUSCULAR; INTRAVENOUS; SOFT TISSUE PRN
Status: DISCONTINUED | OUTPATIENT
Start: 2021-08-09 | End: 2021-08-09

## 2021-08-09 RX ORDER — ONDANSETRON 4 MG/1
4 TABLET, ORALLY DISINTEGRATING ORAL EVERY 30 MIN PRN
Status: DISCONTINUED | OUTPATIENT
Start: 2021-08-09 | End: 2021-08-09 | Stop reason: HOSPADM

## 2021-08-09 RX ORDER — NALOXONE HYDROCHLORIDE 0.4 MG/ML
0.4 INJECTION, SOLUTION INTRAMUSCULAR; INTRAVENOUS; SUBCUTANEOUS
Status: DISCONTINUED | OUTPATIENT
Start: 2021-08-09 | End: 2021-08-09 | Stop reason: HOSPADM

## 2021-08-09 RX ORDER — HYDROMORPHONE HYDROCHLORIDE 1 MG/ML
0.2 INJECTION, SOLUTION INTRAMUSCULAR; INTRAVENOUS; SUBCUTANEOUS EVERY 5 MIN PRN
Status: DISCONTINUED | OUTPATIENT
Start: 2021-08-09 | End: 2021-08-09 | Stop reason: HOSPADM

## 2021-08-09 RX ORDER — FENTANYL CITRATE 50 UG/ML
50 INJECTION, SOLUTION INTRAMUSCULAR; INTRAVENOUS EVERY 5 MIN PRN
Status: DISCONTINUED | OUTPATIENT
Start: 2021-08-09 | End: 2021-08-09 | Stop reason: HOSPADM

## 2021-08-09 RX ORDER — SCOLOPAMINE TRANSDERMAL SYSTEM 1 MG/1
1 PATCH, EXTENDED RELEASE TRANSDERMAL
Status: DISCONTINUED | OUTPATIENT
Start: 2021-08-09 | End: 2021-08-09 | Stop reason: HOSPADM

## 2021-08-09 RX ORDER — ONDANSETRON 2 MG/ML
4 INJECTION INTRAMUSCULAR; INTRAVENOUS EVERY 30 MIN PRN
Status: DISCONTINUED | OUTPATIENT
Start: 2021-08-09 | End: 2021-08-09 | Stop reason: HOSPADM

## 2021-08-09 RX ORDER — FLUMAZENIL 0.1 MG/ML
0.2 INJECTION, SOLUTION INTRAVENOUS
Status: DISCONTINUED | OUTPATIENT
Start: 2021-08-09 | End: 2021-08-09 | Stop reason: HOSPADM

## 2021-08-09 RX ORDER — OXYCODONE HYDROCHLORIDE 5 MG/1
5 TABLET ORAL EVERY 4 HOURS PRN
Status: DISCONTINUED | OUTPATIENT
Start: 2021-08-09 | End: 2021-08-09 | Stop reason: HOSPADM

## 2021-08-09 RX ORDER — ACETAMINOPHEN 325 MG/1
975 TABLET ORAL EVERY 6 HOURS PRN
Qty: 50 TABLET | Refills: 0 | Status: SHIPPED | OUTPATIENT
Start: 2021-08-09 | End: 2022-10-11

## 2021-08-09 RX ORDER — ONDANSETRON 2 MG/ML
INJECTION INTRAMUSCULAR; INTRAVENOUS PRN
Status: DISCONTINUED | OUTPATIENT
Start: 2021-08-09 | End: 2021-08-09

## 2021-08-09 RX ORDER — HYDRALAZINE HYDROCHLORIDE 20 MG/ML
2.5-5 INJECTION INTRAMUSCULAR; INTRAVENOUS EVERY 10 MIN PRN
Status: DISCONTINUED | OUTPATIENT
Start: 2021-08-09 | End: 2021-08-09 | Stop reason: HOSPADM

## 2021-08-09 RX ORDER — FENTANYL CITRATE 50 UG/ML
INJECTION, SOLUTION INTRAMUSCULAR; INTRAVENOUS PRN
Status: DISCONTINUED | OUTPATIENT
Start: 2021-08-09 | End: 2021-08-09

## 2021-08-09 RX ORDER — MEPERIDINE HYDROCHLORIDE 25 MG/ML
12.5 INJECTION INTRAMUSCULAR; INTRAVENOUS; SUBCUTANEOUS
Status: DISCONTINUED | OUTPATIENT
Start: 2021-08-09 | End: 2021-08-09 | Stop reason: HOSPADM

## 2021-08-09 RX ORDER — ACETAMINOPHEN 325 MG/1
975 TABLET ORAL ONCE
Status: COMPLETED | OUTPATIENT
Start: 2021-08-09 | End: 2021-08-09

## 2021-08-09 RX ORDER — SODIUM CHLORIDE, SODIUM LACTATE, POTASSIUM CHLORIDE, CALCIUM CHLORIDE 600; 310; 30; 20 MG/100ML; MG/100ML; MG/100ML; MG/100ML
INJECTION, SOLUTION INTRAVENOUS CONTINUOUS
Status: DISCONTINUED | OUTPATIENT
Start: 2021-08-09 | End: 2021-08-09 | Stop reason: HOSPADM

## 2021-08-09 RX ORDER — OXYCODONE HYDROCHLORIDE 5 MG/1
5 TABLET ORAL
Status: DISCONTINUED | OUTPATIENT
Start: 2021-08-09 | End: 2021-08-09 | Stop reason: HOSPADM

## 2021-08-09 RX ORDER — KETOROLAC TROMETHAMINE 30 MG/ML
INJECTION, SOLUTION INTRAMUSCULAR; INTRAVENOUS PRN
Status: DISCONTINUED | OUTPATIENT
Start: 2021-08-09 | End: 2021-08-09

## 2021-08-09 RX ORDER — PHENAZOPYRIDINE HYDROCHLORIDE 200 MG/1
200 TABLET, FILM COATED ORAL ONCE
Status: COMPLETED | OUTPATIENT
Start: 2021-08-09 | End: 2021-08-09

## 2021-08-09 RX ORDER — AMOXICILLIN 250 MG
1-2 CAPSULE ORAL 2 TIMES DAILY
Qty: 30 TABLET | Refills: 0 | Status: SHIPPED | OUTPATIENT
Start: 2021-08-09 | End: 2022-10-11

## 2021-08-09 RX ORDER — PROPOFOL 10 MG/ML
INJECTION, EMULSION INTRAVENOUS PRN
Status: DISCONTINUED | OUTPATIENT
Start: 2021-08-09 | End: 2021-08-09

## 2021-08-09 RX ORDER — METOPROLOL TARTRATE 1 MG/ML
1-2 INJECTION, SOLUTION INTRAVENOUS EVERY 5 MIN PRN
Status: DISCONTINUED | OUTPATIENT
Start: 2021-08-09 | End: 2021-08-09 | Stop reason: HOSPADM

## 2021-08-09 RX ORDER — CLINDAMYCIN PHOSPHATE 900 MG/50ML
900 INJECTION, SOLUTION INTRAVENOUS
Status: COMPLETED | OUTPATIENT
Start: 2021-08-09 | End: 2021-08-09

## 2021-08-09 RX ORDER — EPHEDRINE SULFATE 50 MG/ML
INJECTION, SOLUTION INTRAMUSCULAR; INTRAVENOUS; SUBCUTANEOUS PRN
Status: DISCONTINUED | OUTPATIENT
Start: 2021-08-09 | End: 2021-08-09

## 2021-08-09 RX ADMIN — LIDOCAINE HYDROCHLORIDE 60 MG: 20 INJECTION, SOLUTION INFILTRATION; PERINEURAL at 10:27

## 2021-08-09 RX ADMIN — SUGAMMADEX 200 MG: 100 INJECTION, SOLUTION INTRAVENOUS at 13:19

## 2021-08-09 RX ADMIN — KETOROLAC TROMETHAMINE 30 MG: 30 INJECTION, SOLUTION INTRAMUSCULAR at 13:14

## 2021-08-09 RX ADMIN — MIDAZOLAM 1 MG: 1 INJECTION INTRAMUSCULAR; INTRAVENOUS at 08:50

## 2021-08-09 RX ADMIN — DEXAMETHASONE SODIUM PHOSPHATE 8 MG: 4 INJECTION, SOLUTION INTRAMUSCULAR; INTRAVENOUS at 10:40

## 2021-08-09 RX ADMIN — DEXMEDETOMIDINE 40 MCG: 100 INJECTION, SOLUTION, CONCENTRATE INTRAVENOUS at 09:53

## 2021-08-09 RX ADMIN — ROCURONIUM BROMIDE 10 MG: 10 INJECTION INTRAVENOUS at 12:24

## 2021-08-09 RX ADMIN — FENTANYL CITRATE 50 MCG: 50 INJECTION, SOLUTION INTRAMUSCULAR; INTRAVENOUS at 14:12

## 2021-08-09 RX ADMIN — ACETAMINOPHEN 975 MG: 325 TABLET, FILM COATED ORAL at 14:44

## 2021-08-09 RX ADMIN — FENTANYL CITRATE 50 MCG: 50 INJECTION, SOLUTION INTRAMUSCULAR; INTRAVENOUS at 13:43

## 2021-08-09 RX ADMIN — ONDANSETRON 4 MG: 2 INJECTION INTRAMUSCULAR; INTRAVENOUS at 12:36

## 2021-08-09 RX ADMIN — OXYCODONE HYDROCHLORIDE 5 MG: 5 TABLET ORAL at 14:42

## 2021-08-09 RX ADMIN — CLINDAMYCIN PHOSPHATE 900 MG: 900 INJECTION, SOLUTION INTRAVENOUS at 10:37

## 2021-08-09 RX ADMIN — HYDROMORPHONE HYDROCHLORIDE 0.25 MG: 1 INJECTION, SOLUTION INTRAMUSCULAR; INTRAVENOUS; SUBCUTANEOUS at 13:36

## 2021-08-09 RX ADMIN — BUPIVACAINE HYDROCHLORIDE 50 ML: 2.5 INJECTION, SOLUTION EPIDURAL; INFILTRATION; INTRACAUDAL at 09:53

## 2021-08-09 RX ADMIN — OXYCODONE HYDROCHLORIDE 5 MG: 5 TABLET ORAL at 17:36

## 2021-08-09 RX ADMIN — PHENAZOPYRIDINE 200 MG: 200 TABLET ORAL at 08:31

## 2021-08-09 RX ADMIN — DEXAMETHASONE SODIUM PHOSPHATE 2 MG: 10 INJECTION, SOLUTION INTRAMUSCULAR; INTRAVENOUS at 09:53

## 2021-08-09 RX ADMIN — Medication 5 MG: at 11:39

## 2021-08-09 RX ADMIN — ROCURONIUM BROMIDE 50 MG: 10 INJECTION INTRAVENOUS at 10:28

## 2021-08-09 RX ADMIN — HYDROMORPHONE HYDROCHLORIDE 0.2 MG: 1 INJECTION, SOLUTION INTRAMUSCULAR; INTRAVENOUS; SUBCUTANEOUS at 15:02

## 2021-08-09 RX ADMIN — FENTANYL CITRATE 50 MCG: 50 INJECTION, SOLUTION INTRAMUSCULAR; INTRAVENOUS at 13:55

## 2021-08-09 RX ADMIN — FENTANYL CITRATE 50 MCG: 50 INJECTION, SOLUTION INTRAMUSCULAR; INTRAVENOUS at 14:03

## 2021-08-09 RX ADMIN — ROCURONIUM BROMIDE 20 MG: 10 INJECTION INTRAVENOUS at 10:56

## 2021-08-09 RX ADMIN — FENTANYL CITRATE 50 MCG: 50 INJECTION INTRAMUSCULAR; INTRAVENOUS at 08:49

## 2021-08-09 RX ADMIN — SODIUM CHLORIDE, POTASSIUM CHLORIDE, SODIUM LACTATE AND CALCIUM CHLORIDE: 600; 310; 30; 20 INJECTION, SOLUTION INTRAVENOUS at 12:26

## 2021-08-09 RX ADMIN — ACETAMINOPHEN 975 MG: 325 TABLET, FILM COATED ORAL at 08:31

## 2021-08-09 RX ADMIN — SODIUM CHLORIDE, POTASSIUM CHLORIDE, SODIUM LACTATE AND CALCIUM CHLORIDE: 600; 310; 30; 20 INJECTION, SOLUTION INTRAVENOUS at 10:15

## 2021-08-09 RX ADMIN — HYDROMORPHONE HYDROCHLORIDE 0.2 MG: 1 INJECTION, SOLUTION INTRAMUSCULAR; INTRAVENOUS; SUBCUTANEOUS at 14:39

## 2021-08-09 RX ADMIN — FENTANYL CITRATE 50 MCG: 50 INJECTION, SOLUTION INTRAMUSCULAR; INTRAVENOUS at 12:11

## 2021-08-09 RX ADMIN — HYDROMORPHONE HYDROCHLORIDE 0.2 MG: 1 INJECTION, SOLUTION INTRAMUSCULAR; INTRAVENOUS; SUBCUTANEOUS at 14:52

## 2021-08-09 RX ADMIN — Medication 2.5 MG: at 10:37

## 2021-08-09 RX ADMIN — FENTANYL CITRATE 50 MCG: 50 INJECTION, SOLUTION INTRAMUSCULAR; INTRAVENOUS at 10:27

## 2021-08-09 RX ADMIN — HYDROMORPHONE HYDROCHLORIDE 0.2 MG: 1 INJECTION, SOLUTION INTRAMUSCULAR; INTRAVENOUS; SUBCUTANEOUS at 14:30

## 2021-08-09 RX ADMIN — PROPOFOL 150 MG: 10 INJECTION, EMULSION INTRAVENOUS at 10:27

## 2021-08-09 RX ADMIN — MAGNESIUM SULFATE HEPTAHYDRATE 2 G: 40 INJECTION, SOLUTION INTRAVENOUS at 15:36

## 2021-08-09 RX ADMIN — Medication 2.5 MG: at 10:58

## 2021-08-09 RX ADMIN — SCOPALAMINE 1 PATCH: 1 PATCH, EXTENDED RELEASE TRANSDERMAL at 09:01

## 2021-08-09 RX ADMIN — HYDROMORPHONE HYDROCHLORIDE 0.25 MG: 1 INJECTION, SOLUTION INTRAMUSCULAR; INTRAVENOUS; SUBCUTANEOUS at 13:31

## 2021-08-09 RX ADMIN — GENTAMICIN SULFATE 340 MG: 40 INJECTION, SOLUTION INTRAMUSCULAR; INTRAVENOUS at 10:37

## 2021-08-09 RX ADMIN — HYDROMORPHONE HYDROCHLORIDE 0.4 MG: 1 INJECTION, SOLUTION INTRAMUSCULAR; INTRAVENOUS; SUBCUTANEOUS at 15:15

## 2021-08-09 ASSESSMENT — ENCOUNTER SYMPTOMS
SEIZURES: 0
DYSRHYTHMIAS: 0

## 2021-08-09 ASSESSMENT — MIFFLIN-ST. JEOR: SCORE: 1555

## 2021-08-09 NOTE — DISCHARGE INSTRUCTIONS
Same-Day Surgery   Adult Discharge Orders & Instructions     For 24 hours after surgery:  1. Get plenty of rest.  A responsible adult must stay with you for at least 24 hours after you leave the hospital.   2. Pain medication can slow your reflexes. Do not drive or use heavy equipment.  If you have weakness or tingling, don't drive or use heavy equipment until this feeling goes away.  3. Mixing alcohol and pain medication can cause dizziness and slow your breathing. It can even be fatal. Do not drink alcohol while taking pain medication.  4. Avoid strenuous or risky activities.  Ask for help when climbing stairs.   5. You may feel lightheaded.  If so, sit for a few minutes before standing.  Have someone help you get up.   6. If you have nausea (feel sick to your stomach), drink only clear liquids such as apple juice, ginger ale, broth or 7-Up.  Rest may also help.  Be sure to drink enough fluids.  Move to a regular diet as you feel able. Take pain medications with a small amount of solid food, such as toast or crackers, to avoid nausea.   7. A slight fever is normal. Call the doctor if your fever is over 100 F (37.7 C) (taken under the tongue) or lasts longer than 24 hours.  8. You may have a dry mouth, muscle aches, trouble sleeping or a sore throat.  These symptoms should go away after 24 hours.  9. Do not make important or legal decisions.   Pain Management:      1. Take pain medication (if prescribed) for pain as directed by your physician.        2. WARNING: If the pain medication you have been prescribed contains Tylenol  (acetaminophen), DO NOT take additional doses of Tylenol (acetaminophen).     Call your doctor for any of the followin.  Signs of infection (fever, growing tenderness at the surgery site, severe pain, a large amount of drainage or bleeding, foul-smelling drainage, redness, swelling).    2.  It has been over 8 to 10 hours since surgery and you are still not able to urinate (pee).    3.   Headache for over 24 hours.    4.  Numbness, tingling or weakness the day after surgery (if you had spinal anesthesia).  To contact a doctor, call _____________________________________ or:      [phone] and ask for the Resident On Call for:          __________________________________________ (answered 24 hours a day)      Emergency Department:  [facility]: [phone]  [facility]: [phone]               Rev. 10/2014   Same-Day Surgery   Adult Discharge Orders & Instructions     For 24 hours after surgery:  10. Get plenty of rest.  A responsible adult must stay with you for at least 24 hours after you leave the hospital.   11. Pain medication can slow your reflexes. Do not drive or use heavy equipment.  If you have weakness or tingling, don't drive or use heavy equipment until this feeling goes away.  12. Mixing alcohol and pain medication can cause dizziness and slow your breathing. It can even be fatal. Do not drink alcohol while taking pain medication.  13. Avoid strenuous or risky activities.  Ask for help when climbing stairs.   14. You may feel lightheaded.  If so, sit for a few minutes before standing.  Have someone help you get up.   15. If you have nausea (feel sick to your stomach), drink only clear liquids such as apple juice, ginger ale, broth or 7-Up.  Rest may also help.  Be sure to drink enough fluids.  Move to a regular diet as you feel able. Take pain medications with a small amount of solid food, such as toast or crackers, to avoid nausea.   16. A slight fever is normal. Call the doctor if your fever is over 100 F (37.7 C) (taken under the tongue) or lasts longer than 24 hours.  17. You may have a dry mouth, muscle aches, trouble sleeping or a sore throat.  These symptoms should go away after 24 hours.  18. Do not make important or legal decisions.   Pain Management:      1. Take pain medication (if prescribed) for pain as directed by your  physician.        2. WARNING: If the pain medication you have been prescribed contains Tylenol  (acetaminophen), DO NOT take additional doses of Tylenol (acetaminophen).     Call your doctor for any of the followin.  Signs of infection (fever, growing tenderness at the surgery site, severe pain, a large amount of drainage or bleeding, foul-smelling drainage, redness, swelling).    2.  It has been over 8 to 10 hours since surgery and you are still not able to urinate (pee).    3.  Headache for over 24 hours.    4.  Numbness, tingling or weakness the day after surgery (if you had spinal anesthesia).  To contact a doctor, call Dr. Lantigua at [facility] or:      [phone] and ask for the Resident On Call for:          Surgical gynocology  (answered 24 hours a day)      Emergency Department:  [facility]: [phone]  [facility]: [phone]               Rev. 10/2014

## 2021-08-09 NOTE — OR NURSING
Pt stated she had a weird burning feeling in her arm when the Magnesium was infusing. MDA here and stated since her pain was much improved ( pt states it is a 1-2) we could stop the infusion. Pt state that within minutes the burning was gone. Head of bed moved up 10 degrees to help pt get used to sitting up.

## 2021-08-09 NOTE — ANESTHESIA CARE TRANSFER NOTE
Patient: Aure Kolb    Procedure(s):  HYSTERECTOMY, TOTAL, LAPAROSCOPIC, WITH BILATERAL SALPINGECTOMY    Diagnosis: Iron deficiency anemia due to chronic blood loss [D50.0]  Abnormal uterine bleeding (AUB) [N93.9]  Adenomyosis [N80.0]  Diagnosis Additional Information: No value filed.    Anesthesia Type:   General     Note:    Oropharynx: oropharynx clear of all foreign objects and spontaneously breathing  Level of Consciousness: awake and drowsy  Oxygen Supplementation: face mask  Level of Supplemental Oxygen (L/min / FiO2): 6 L  Independent Airway: airway patency satisfactory and stable  Dentition: dentition unchanged  Vital Signs Stable: post-procedure vital signs reviewed and stable  Report to RN Given: handoff report given  Patient transferred to: PACU  Comments: Patient reports pain of 8 out of 10. CRNA administered a total of 0.5 mg of hydromorphone. Patient reports improvement in pain level upon handoff to PACU team.   Handoff Report: Identifed the Patient, Identified the Reponsible Provider, Reviewed the pertinent medical history, Discussed the surgical course, Reviewed Intra-OP anesthesia mangement and issues during anesthesia, Set expectations for post-procedure period and Allowed opportunity for questions and acknowledgement of understanding      Vitals:  Vitals Value Taken Time   BP     Temp     Pulse     Resp     SpO2         Electronically Signed By: Ashley M. Mulvihill, APRN CRNA  August 9, 2021  1:39 PM

## 2021-08-09 NOTE — OP NOTE
[facility] Gynecologic Operative Note   Aure Kolb  [id number]  8/9/2021    Preoperative Diagnosis:   - Abnormal uterine bleeding  - Adenomyosis  - Iron deficiency anemia  Postoperative Diagnosis:   - Same   Procedure: Total laparoscopic hysterectomy, bilateral salpingectomy   Surgeon: Nina Lantigua MD  Assistant(s): MD Naida Quintero MD PGY-3  Anesthesia: General endotracheal   Specimens: Uterus, cervix, bilateral fallopian tubes.  Complications: None     EBL: 100 mL   IVF: 1200 mL crystalloid   UOP: 500 mL orange tinged urine     Findings:  EUA with mobile anteverted uterus. No adnexal fullness. Laparoscopic survey with no evidence of injury upon entry. Filmy adhesions between colon and left side wall. Bulbous fundus. Otherwise normal appearing uterus. Normal appearing fallopian tubes and ovaries. 2 small paratubal cysts on right fallopian tube. Powder burn lesion along bladder peritoneum consistent with endometriosis. Numerous small, clear, cystic lesions along posterior cul de sac consistent with endometriosis. Scarring of peritoneum along left pelvic side wall.  Hemostatic surgical pedicles at close of procedure.     Indications: Aure Kolb is a 36 year old female who presented to clinic with complaint of chronic pelvic pain and heavy menses. An US was obtained which was consistent with adenomyosis. An EMB was obtained which showed benign proliferative endometrium. She declined hormonal treatment and wished to proceed instead with definitive management.  A total laparoscopic hysterectomy with bilateral salpingectomy was recommended at that time. All risks, benefits and alternatives were discussed and written informed consent was obtained.     Procedure: She received gentamycin, clindamycin and pyridium preoperatively. The patient was taken to the operating room where she was placed in the dorsal lithotomy position with feet in yellow fin stirrups. General endotracheal anesthesia was  administered. An exam under anesthesia was performed with the above findings noted. The patient was then prepped and draped in the usual sterile fashion. A speculum was inserted into the vagina, a single toothed tenaculum placed on the cervix at 12 o'clock, and a uterine manipulator inserted into the cervical os. The speculum was removed. Attention was then turned to the abdomen. An 11-blade scalpel was used to make a 5 mm vertical l incision at the superior aspect of the umbilicus and a fili was used to expand the incision and bluntly dissect through the subcutaneous tissue to the fascia. The 30 degree laparoscope was placed in the Visiport and the abdomen was entered under direct visualization. CO2 gas was attached to the port. Pneumoperitoneum was achieved with good tympany of the abdomen. The 5 mm laparoscope was placed in the port and visualized the abdomen which was free of any injury. Attention was turned to the LLQ of the abdomen where a site 3 cm medial and superior to the anterior superior iliac crest was noted to be free of major blood vessels, a 5 mm horizontal skin incision made. The incision was stretched with a fili. A 5 mm port was placed under visualization within the abdomen. A 5 mm port was placed in the RLQ of the abdomen with similar technique under visualization.     A survey of the patient's abdomen and pelvis was made with the above noted findings. Attention was then turned to the pelvis. The right fallopian tube was identified. It was cauterized cut and  from the adjacent mesosalpinx using the Ligasure device. It was then removed from the abdomen. Attention was then turned to the left adnexa at which time the left tube was similarly excised and then removed from the abdomen. The right round ligament was then grasped and transected using the Ligasure. The right utero ovarian ligament was then divided using the Ligasure device. The anterior leaf of the broad ligament was then  incised along the bladder reflection to the midline. Filmy adhesions between the colon and the left pelvic side wall were taken down using the Ligasure as well as blunt dissection. The left round ligament was then transected using the Ligasure. In a similar fashion, the left utero ovarian ligament was then divided using the Ligasure device. From this side, the anterior leaf of the broad ligament was incised along the bladder reflection to the midline. A bladder flap was mobilized and the peritoneum on the vesicouterine fold was incised to mobilize the bladder. The uterine arteries were then transected and ligated using the Ligasure device, down to the level of the colpotomy ring. A circumferential colpotomy was then performed along the cup using the laparoscopic spatual, resulting in separation of the uterus and cervix. The specimen was then delivered through the vagina. The laparoscope was removed and the CO2 gas was turned off.     Vaginal retractors were then placed in order to visualize the vaginal cuff. The vaginal cuff was closed using 4 interrupted figure of X stitches of 0-Vicryl with care taken to incorporate the utero sacral ligaments into the two lateral stitches. A digital exam of the vaginal cuff was performed and no defect was noted.     The laparoscope was reinserted, CO2 gas was turned of and pneumopertineum was achieved. The pelvis was irrigated. A final look at the surgical sites showed excellent hemostasis. The abdomen was the desufflated and all trocars were then removed. The skin incisions were then closed using 4-0 monocryl and covered with Exophin. All instruments were removed from the vagina and the villafana catheter was removed.     The patient tolerated the procedure well.  She was given Toradol at the end of the procedure. Sponge, lap and needle counts were correct. The patient was taken to the recovery area in stable condition.    Dr. Lantigua was present and scrubbed for the entire  procedure.     Naida Phillip MD  Obstetrics and Gynecology, PGY-3  08/09/21 2:21 PM

## 2021-08-09 NOTE — ANESTHESIA PROCEDURE NOTES
TAP Procedure Note  Pre-Procedure   Staff -        Anesthesiologist:  Yolanda Badillo MD       Performed By: anesthesiologist       Location: pre-op       Pre-Anesthestic Checklist: patient identified, IV checked, site marked, risks and benefits discussed, informed consent, monitors and equipment checked, pre-op evaluation, at physician/surgeon's request and post-op pain management  Timeout:       Correct Patient: Yes        Correct Procedure: Yes        Correct Site: Yes        Correct Position: Yes        Correct Laterality: Yes        Site Marked: Yes  Procedure Documentation  Procedure: TAP       Laterality: bilateral       Patient Position: supine       Skin prep: Chloraprep       Needle Type: short bevel       Needle Gauge: 21.        Needle Length (millimeters): 100        Ultrasound guided       1. Ultrasound was used to identify targeted nerve, plexus, vascular marker, or fascial plane and place a needle adjacent to it in real-time.       2. Ultrasound was used to visualize the spread of anesthetic in close proximity to the above referenced structure.       3. A permanent image is entered into the patient's record.       4. The visualized anatomic structures appeared normal.       5. There were no apparent abnormal pathologic findings.    Assessment/Narrative         The placement was negative for: blood aspirated, painful injection and site bleeding       Paresthesias: No.      Bolus given via needle. No blood aspirated via catheter.        Secured via.        Insertion/Infusion Method: Single Shot       Complications: none

## 2021-08-09 NOTE — BRIEF OP NOTE
[facility]    Brief Operative Note    Pre-operative diagnosis: Iron deficiency anemia due to chronic blood loss [D50.0]  Abnormal uterine bleeding (AUB) [N93.9]  Adenomyosis [N80.0]  Post-operative diagnosis Same as pre-operative diagnosis    Procedure: Procedure(s):  HYSTERECTOMY, TOTAL, LAPAROSCOPIC, WITH BILATERAL SALPINGECTOMY  Surgeon: Surgeon(s) and Role:     * Nina Lantigua MD - Primary     * Nina Brock MD - Assisting     * Naida Phillip MD PGY-3 - Assisting  Anesthesia: Combined General with Block   Estimated blood loss: 100 mL  IVF: 1200 mL crystalloid  UOP: 500 mL orange tinged urine  Drains: None  Specimens: Uterus, cervix, bilateral fallopian tubes  Findings:   EUA with mobile anteverted uterus. No adnexal fullness. Laparoscopic survey with no evidence of injury upon entry. Filmy adhesions between colon and left side wall. Bulbous, lopsided appearing uterus. Normal appearing fallopian tubes and ovaries. 2 small paratubal cysts on right fallopian tube. Hemostatic surgical pedicles at close of procedure.   Complications: None.  Implants: * No implants in log *    Naida Phillip MD  Obstetrics and Gynecology, PGY-3  08/09/21 1:30 PM

## 2021-08-09 NOTE — ANESTHESIA PROCEDURE NOTES
Airway       Patient location during procedure: OR       Procedure Start/Stop Times: 8/9/2021 10:31 AM  Staff -        CRNA: Mulvihill, Ashley M, APRN CRNA       Performed By: CRNA  Consent for Airway        Urgency: elective  Indications and Patient Condition       Indications for airway management: silvia-procedural       Induction type:intravenous       Mask difficulty assessment: 1 - vent by mask    Final Airway Details       Final airway type: endotracheal airway       Successful airway: Oral and ETT - single  Endotracheal Airway Details        ETT size (mm): 7.0       Cuffed: yes       Successful intubation technique: video laryngoscopy       VL Blade Size: MAC 3       Grade View of Cords: 1       Adjucts: stylet       Position: Right       Measured from: lips       Secured at (cm): 21       Bite block used: None    Post intubation assessment        Placement verified by: capnometry, equal breath sounds and chest rise        Number of attempts at approach: 1       Number of other approaches attempted: 0       Secured with: commercial tube dunn and silk tape       Ease of procedure: easy       Dentition: Intact and Unchanged

## 2021-08-09 NOTE — ANESTHESIA PREPROCEDURE EVALUATION
Anesthesia Pre-Procedure Evaluation    Patient: Aure Kolb   MRN: [id number] : 1985        Preoperative Diagnosis: Iron deficiency anemia due to chronic blood loss [D50.0]  Abnormal uterine bleeding (AUB) [N93.9]  Adenomyosis [N80.0]   Procedure : Procedure(s):  HYSTERECTOMY, TOTAL, LAPAROSCOPIC, WITH BILATERAL SALPINGECTOMY     Past Medical History:   Diagnosis Date     Bulging lumbar disc     dx clinically chiro- given voltaren gel     Migraine aura without headache     woozy, spacy, blurry vision, dry mouth- when weather changes, started since age ~25 yrs     Obesity      Seasonal allergic rhinitis     claritin, benadryl if really needed (nose bleeds on nasal sprays)     Tension headache       Past Surgical History:   Procedure Laterality Date     DILATION AND CURETTAGE      uterine polyps,  and      wisdom teeth        Allergies   Allergen Reactions     Amoxicillin Anaphylaxis     Benzoyl Peroxide      PN: LW Reaction: swelling     Doxycycline      PN: LW Reaction: RESPIRATORY DISTRESS     Fexofenadine Difficulty breathing     Hpv 9-Valent Recomb Vaccine [Quadrivalent Hpv, 6,11,16,18] Difficulty breathing     Minocycline      PN: LW Reaction: RESPIRATORY DISTRESS     Penicillins Rash     Sulfa Drugs Unknown and Rash     As a baby      Social History     Tobacco Use     Smoking status: Never Smoker     Smokeless tobacco: Never Used   Substance Use Topics     Alcohol use: Yes     Alcohol/week: 1.0 standard drinks     Types: 1 Standard drinks or equivalent per week     Comment: rarely      Wt Readings from Last 1 Encounters:   21 89 kg (196 lb 3.4 oz)        Anesthesia Evaluation   Pt has had prior anesthetic.     No history of anesthetic complications       ROS/MED HX  ENT/Pulmonary:     (+) Intermittent, asthma Last exacerbation: years ago,  Treatment: Inhaler prn,      Neurologic:    (-) no seizures and no CVA   Cardiovascular:    (-) arrhythmias and murmur   METS/Exercise  Tolerance: >4 METS    Hematologic:     (+) anemia,     Musculoskeletal:       GI/Hepatic:    (-) GERD   Renal/Genitourinary:    (-) renal disease   Endo:     (+) Obesity,  (-) Type II DM   Psychiatric/Substance Use:    (-) psychiatric history and alcohol abuse history   Infectious Disease:    (-) Recent Fever   Malignancy:    (-) malignancy   Other:      (+) , no H/O Chronic Pain, (-) Any chance pregnant       Physical Exam    Airway        Mallampati: III   TM distance: > 3 FB   Neck ROM: full   Mouth opening: > 3 cm    Respiratory Devices and Support         Dental  no notable dental history         Cardiovascular          Rhythm and rate: regular and normal (-) no murmur    Pulmonary           breath sounds clear to auscultation           OUTSIDE LABS:  CBC:   Lab Results   Component Value Date    WBC 6.5 08/06/2021    WBC 7.3 04/01/2021    HGB 11.3 (L) 08/06/2021    HGB 12.2 04/01/2021    HCT 35.5 08/06/2021    HCT 37.0 04/01/2021     08/06/2021     04/01/2021     BMP:   Lab Results   Component Value Date     06/03/2021    POTASSIUM 3.9 06/03/2021    CHLORIDE 104 06/03/2021    CO2 25 06/03/2021    BUN 15 06/03/2021    CR 0.65 06/03/2021     (H) 08/09/2021     (H) 06/03/2021     COAGS: No results found for: PTT, INR, FIBR  POC:   Lab Results   Component Value Date    HCG Negative 08/09/2021     HEPATIC:   Lab Results   Component Value Date    ALBUMIN 3.8 06/03/2021    PROTTOTAL 8.1 06/03/2021    ALT 32 06/03/2021    AST 17 06/03/2021    ALKPHOS 81 06/03/2021    BILITOTAL 0.2 06/03/2021     OTHER:   Lab Results   Component Value Date    A1C 5.6 02/09/2021    OCTAVIO 9.2 06/03/2021    TSH 1.88 06/03/2021    T4 1.06 06/03/2021       Anesthesia Plan    ASA Status:  2   NPO Status:  NPO Appropriate    Anesthesia Type: General.     - Airway: ETT   Induction: Intravenous.   Maintenance: Balanced.   Techniques and Equipment:     - Airway: Video-Laryngoscope     - Lines/Monitors: 2nd IV      Consents         - Specific Concerns: PONV, dental damage, allergic reactions.     - Extended Intubation/Ventilatory Support Discussed: No.      - Patient is DNR/DNI Status: No    Use of blood products discussed: Yes.     - Discussed with: Patient.     - Consented: consented to blood products            Reason for refusal: other.     Postoperative Care    Pain management: IV analgesics, Multi-modal analgesia, Peripheral nerve block (Single Shot).   PONV prophylaxis: Ondansetron (or other 5HT-3), Scopolamine patch, Dexamethasone or Solumedrol     Comments:    36F for lap hysterectomy for abnormal uterine bleeding. Otherwise healthy with the exception of asthma symptoms brought on by allergies, with last inhaler use years ago. History of anaphylactic reaction multiple medications, discussed increased risk of anaphylactic reaction to additional anesthetic medications. Discussed plan for general anesthetic with oral ETT. Regional team planning preop TAP blocks. Patient voiced understanding of plan and had no further questions.     MD Rei Nye MD

## 2021-08-09 NOTE — ANESTHESIA POSTPROCEDURE EVALUATION
Patient: Aure Kolb    Procedure(s):  HYSTERECTOMY, TOTAL, LAPAROSCOPIC, WITH BILATERAL SALPINGECTOMY    Diagnosis:Iron deficiency anemia due to chronic blood loss [D50.0]  Abnormal uterine bleeding (AUB) [N93.9]  Adenomyosis [N80.0]  Diagnosis Additional Information: No value filed.    Anesthesia Type:  General    Note:  Disposition: Outpatient   Postop Pain Control: Challenging            Challenges/Interventions: Acute Pain            Sign Out: Well controlled pain   PONV: No   Neuro/Psych: Uneventful            Sign Out: Acceptable/Baseline neuro status   Airway/Respiratory: Uneventful            Sign Out: Acceptable/Baseline resp. status   CV/Hemodynamics: Uneventful            Sign Out: Acceptable CV status; No obvious hypovolemia; No obvious fluid overload   Other NRE: NONE   DID A NON-ROUTINE EVENT OCCUR? No    Event details/Postop Comments:  Pain improved after oxycodone and magnesium            Last vitals:  Vitals Value Taken Time   /61 08/09/21 1600   Temp 36.7  C (98.1  F) 08/09/21 1530   Pulse 57 08/09/21 1601   Resp 9 08/09/21 1601   SpO2 99 % 08/09/21 1601   Vitals shown include unvalidated device data.    Electronically Signed By: Rei Pulido MD  August 9, 2021  4:02 PM

## 2021-08-11 LAB
PATH REPORT.COMMENTS IMP SPEC: NORMAL
PATH REPORT.COMMENTS IMP SPEC: NORMAL
PATH REPORT.FINAL DX SPEC: NORMAL
PATH REPORT.GROSS SPEC: NORMAL
PATH REPORT.MICROSCOPIC SPEC OTHER STN: NORMAL
PATH REPORT.RELEVANT HX SPEC: NORMAL
PHOTO IMAGE: NORMAL

## 2021-08-23 NOTE — PROGRESS NOTES
GYN Clinic Visit  2021    CC:  Post-operative visit    HPI:  Aure Kolb is a 36 year old  who presents 2 weeks status post TLH, BS on 21 for AUB.  Since her surgery patient reports she's doing well. Pain is well controlled with ibuprofen, better than it was before surgery.  Either not hungry at all or super hungry.  Working on eating more regularly now that we're 2 weeks out.  No N/V.  Gets uncomfortable if eats too much.   Reviewed PMH, PSH, Meds and allergies.    Objective:  Vitals:    21 0954   BP: 108/67   Pulse: 73   SpO2: 100%   Weight: 87.5 kg (193 lb)     Body mass index is 33.13 kg/m .    General: alert, oriented, no distress  Abd: soft, nontender, nondistended, no masses or organomegaly.  well healed laparoscopy incisions  :   - vulva: normal external genitalia, normal hair pattern, no lesions, normal Bartholin's, normal [place]'s. urethera appears normal and is well supported.   - vagina: normal vaginal mucosa with rugae, no irritation, no abnormal discharge present.  Vaginal cuff intact   - cervix: surgically absent.    - bimanual exam reveals intact cuff  Rectal: deferred    Assessment:  Satisfactory post-operative course.    Plan:   1. S/P hysterectomy  Recovering appropriately.  Cuff intact.  Discussed continued restrictions.        Return to clinic 4w for final postop check.    Nina Lantigua MD

## 2021-08-24 ENCOUNTER — OFFICE VISIT (OUTPATIENT)
Dept: OBGYN | Facility: CLINIC | Age: 36
End: 2021-08-24
Payer: COMMERCIAL

## 2021-08-24 VITALS
OXYGEN SATURATION: 100 % | DIASTOLIC BLOOD PRESSURE: 67 MMHG | WEIGHT: 193 LBS | SYSTOLIC BLOOD PRESSURE: 108 MMHG | HEART RATE: 73 BPM | BODY MASS INDEX: 33.13 KG/M2

## 2021-08-24 DIAGNOSIS — Z90.710 S/P HYSTERECTOMY: Primary | ICD-10-CM

## 2021-08-24 PROCEDURE — 99024 POSTOP FOLLOW-UP VISIT: CPT | Performed by: OBSTETRICS & GYNECOLOGY

## 2021-09-14 ENCOUNTER — OFFICE VISIT (OUTPATIENT)
Dept: FAMILY MEDICINE | Facility: CLINIC | Age: 36
End: 2021-09-14
Payer: COMMERCIAL

## 2021-09-14 VITALS
TEMPERATURE: 98.5 F | DIASTOLIC BLOOD PRESSURE: 77 MMHG | HEART RATE: 95 BPM | HEIGHT: 64 IN | WEIGHT: 195.2 LBS | SYSTOLIC BLOOD PRESSURE: 128 MMHG | OXYGEN SATURATION: 97 % | RESPIRATION RATE: 20 BRPM | BODY MASS INDEX: 33.32 KG/M2

## 2021-09-14 DIAGNOSIS — Z20.822 EXPOSURE TO 2019 NOVEL CORONAVIRUS: Primary | ICD-10-CM

## 2021-09-14 DIAGNOSIS — J02.9 SORE THROAT: ICD-10-CM

## 2021-09-14 LAB — DEPRECATED S PYO AG THROAT QL EIA: NEGATIVE

## 2021-09-14 PROCEDURE — U0005 INFEC AGEN DETEC AMPLI PROBE: HCPCS | Performed by: FAMILY MEDICINE

## 2021-09-14 PROCEDURE — U0003 INFECTIOUS AGENT DETECTION BY NUCLEIC ACID (DNA OR RNA); SEVERE ACUTE RESPIRATORY SYNDROME CORONAVIRUS 2 (SARS-COV-2) (CORONAVIRUS DISEASE [COVID-19]), AMPLIFIED PROBE TECHNIQUE, MAKING USE OF HIGH THROUGHPUT TECHNOLOGIES AS DESCRIBED BY CMS-2020-01-R: HCPCS | Performed by: FAMILY MEDICINE

## 2021-09-14 PROCEDURE — 87651 STREP A DNA AMP PROBE: CPT | Performed by: FAMILY MEDICINE

## 2021-09-14 PROCEDURE — 99213 OFFICE O/P EST LOW 20 MIN: CPT | Performed by: FAMILY MEDICINE

## 2021-09-14 ASSESSMENT — MIFFLIN-ST. JEOR: SCORE: 1550.42

## 2021-09-14 NOTE — PROGRESS NOTES
"    Assessment & Plan     Sore throat    - Streptococcus A Rapid Screen w/Reflex to PCR - Clinic Collect  - Group A Streptococcus PCR Throat Swab    Exposure to 2019 novel coronavirus    - Asymptomatic COVID-19 Virus (Coronavirus) by PCR Nose    Troy Franck Underwood MD  [facility] Blankenship is a 36 year old who presents for the following health issues     HPI         Acute Illness  Acute illness concerns: pt is strep carrier, was exposed to strep recently  Onset/Duration: x4 days  Symptoms:  Fever: no  Chills/Sweats: no  Headache (location?): no  Sinus Pressure: no  Conjunctivitis:  no  Ear Pain: no  Rhinorrhea: no  Congestion: YES  Sore Throat: YES- allergy-related?  Cough: no  Wheeze: no  Decreased Appetite: no  Nausea: no  Vomiting: no  Diarrhea: no  Dysuria/Freq.: no  Dysuria or Hematuria: no  Fatigue/Achiness: no  Sick/Strep Exposure: YES- pt's friend has strep, also pt's nephew was exposed to someone with COVID, spent time together this weekend  Therapies tried and outcome: antihistamines - helpful      Review of Systems   Constitutional, HEENT, cardiovascular, pulmonary, gi and gu systems are negative, except as otherwise noted.      Objective    Resp 20   Ht 1.626 m (5' 4\")   Wt 88.5 kg (195 lb 3.2 oz)   LMP 08/03/2021 (Approximate)   BMI 33.51 kg/m    Body mass index is 33.51 kg/m .  Physical Exam   GENERAL: healthy, alert and no distress  NECK: no adenopathy, no asymmetry, masses, or scars and thyroid normal to palpation  Mild throat eyrtyema  RESP: lungs clear to auscultation - no rales, rhonchi or wheezes    MS: no gross musculoskeletal defects noted, no edema                "

## 2021-09-15 LAB — GROUP A STREP BY PCR: NOT DETECTED

## 2021-09-15 ASSESSMENT — ASTHMA QUESTIONNAIRES: ACT_TOTALSCORE: 24

## 2021-09-16 ENCOUNTER — OFFICE VISIT (OUTPATIENT)
Dept: OBGYN | Facility: CLINIC | Age: 36
End: 2021-09-16
Payer: COMMERCIAL

## 2021-09-16 VITALS
OXYGEN SATURATION: 100 % | WEIGHT: 196 LBS | BODY MASS INDEX: 33.64 KG/M2 | DIASTOLIC BLOOD PRESSURE: 66 MMHG | HEART RATE: 82 BPM | SYSTOLIC BLOOD PRESSURE: 114 MMHG

## 2021-09-16 DIAGNOSIS — Z90.710 S/P HYSTERECTOMY: Primary | ICD-10-CM

## 2021-09-16 LAB — SARS-COV-2 RNA RESP QL NAA+PROBE: NEGATIVE

## 2021-09-16 PROCEDURE — 99024 POSTOP FOLLOW-UP VISIT: CPT | Performed by: OBSTETRICS & GYNECOLOGY

## 2021-09-16 NOTE — PROGRESS NOTES
"SUBJECTIVE:  Aure Kolb presents for post-operative evaluation of a painful incision. She is status post H BS on 8/9/21.  She reports that she is recovering well but notes continued pain at the left laparoscopic incision.  She can feel a painful \"poking\" sensation in that area.    OBJECTIVE:  Abd: soft, non-tender.  Incisions healing well.  The left laparoscopic incision was intact, with no erythema.  A small (likely) suture fragment appeared to be protruding.  With her consent, Betadine prep was used, and the fragment was grasped with a small mosquito forceps.  Bacitracin was applied.     ASSESSMENT:  Incisional pain.  Otherwise doing well.    PLAN:  Follow up as needed.    "

## 2021-09-28 ENCOUNTER — MYC MEDICAL ADVICE (OUTPATIENT)
Dept: FAMILY MEDICINE | Facility: CLINIC | Age: 36
End: 2021-09-28

## 2021-10-09 ENCOUNTER — HEALTH MAINTENANCE LETTER (OUTPATIENT)
Age: 36
End: 2021-10-09

## 2021-10-13 ENCOUNTER — OFFICE VISIT (OUTPATIENT)
Dept: OBGYN | Facility: CLINIC | Age: 36
End: 2021-10-13
Payer: COMMERCIAL

## 2021-10-13 VITALS
WEIGHT: 198 LBS | HEART RATE: 74 BPM | OXYGEN SATURATION: 97 % | BODY MASS INDEX: 33.99 KG/M2 | DIASTOLIC BLOOD PRESSURE: 66 MMHG | SYSTOLIC BLOOD PRESSURE: 103 MMHG

## 2021-10-13 DIAGNOSIS — Z90.710 S/P HYSTERECTOMY: ICD-10-CM

## 2021-10-13 DIAGNOSIS — Z09 POSTOP CHECK: Primary | ICD-10-CM

## 2021-10-13 DIAGNOSIS — Z23 NEED FOR PROPHYLACTIC VACCINATION AND INOCULATION AGAINST INFLUENZA: ICD-10-CM

## 2021-10-13 DIAGNOSIS — D50.0 IRON DEFICIENCY ANEMIA DUE TO CHRONIC BLOOD LOSS: ICD-10-CM

## 2021-10-13 LAB
ERYTHROCYTE [DISTWIDTH] IN BLOOD BY AUTOMATED COUNT: 14.4 % (ref 10–15)
HBA1C MFR BLD: 5.5 % (ref 0–5.6)
HCT VFR BLD AUTO: 37.3 % (ref 35–47)
HGB BLD-MCNC: 11.9 G/DL (ref 11.7–15.7)
MCH RBC QN AUTO: 26.4 PG (ref 26.5–33)
MCHC RBC AUTO-ENTMCNC: 31.9 G/DL (ref 31.5–36.5)
MCV RBC AUTO: 83 FL (ref 78–100)
PLATELET # BLD AUTO: 277 10E3/UL (ref 150–450)
RBC # BLD AUTO: 4.51 10E6/UL (ref 3.8–5.2)
WBC # BLD AUTO: 6.8 10E3/UL (ref 4–11)

## 2021-10-13 PROCEDURE — 36415 COLL VENOUS BLD VENIPUNCTURE: CPT | Performed by: OBSTETRICS & GYNECOLOGY

## 2021-10-13 PROCEDURE — 85027 COMPLETE CBC AUTOMATED: CPT | Performed by: OBSTETRICS & GYNECOLOGY

## 2021-10-13 PROCEDURE — 83036 HEMOGLOBIN GLYCOSYLATED A1C: CPT | Performed by: OBSTETRICS & GYNECOLOGY

## 2021-10-13 PROCEDURE — 90686 IIV4 VACC NO PRSV 0.5 ML IM: CPT | Performed by: OBSTETRICS & GYNECOLOGY

## 2021-10-13 PROCEDURE — 99024 POSTOP FOLLOW-UP VISIT: CPT | Performed by: OBSTETRICS & GYNECOLOGY

## 2021-10-13 PROCEDURE — 90471 IMMUNIZATION ADMIN: CPT | Performed by: OBSTETRICS & GYNECOLOGY

## 2021-10-13 NOTE — PROGRESS NOTES
CC:  Post-operative visit    HPI:  Aure Kolb is a 36 year old  who presents 9 weeks status post TLH on 21 for abnormal uterine bleeding, dysmenorrhea.  Since her surgery patient reports the following concerns:  Recently seen in the office for suture removal, as the tail of one suture was poking out and becoming irritated.    Pain:  Minimal.  Some hip soreness and core weakness, but working with PT  Nausea/vomting:  none  Appetite:  normal  Voiding:  no concerns  Bowel movements:  normal  Ambulation:  Working with PT on longstanding issues, worsened a bit by surgery.     Reviewed PMH, PSH, Meds and allergies.    Objective:  Vitals:    10/13/21 0858   BP: 103/66   Pulse: 74   SpO2: 97%   Weight: 89.8 kg (198 lb)     Body mass index is 33.99 kg/m .    General: alert, oriented, no distress  Abd: soft, nontender, nondistended, no masses or organomegaly. well healed laparoscopy scars.  Small scab at site of suture removal, but incision intact.  :   - vulva: normal external genitalia, normal hair pattern, no lesions, normal Bartholin's, normal [place]'s.   - vagina: normal vaginal mucosa with rugae, no irritation, no discharge present  - cervix: surgically absent.  Vaginal cuff intact\  - bimanual exam reveals intact vaginal cuff.  No adnexal masses or tenderess  Rectal: deferred    Assessment:  Satisfactory post-operative course.    Plan:   1. Postop check  2. S/P hysterectomy  - Hemoglobin A1c; Future  - CBC with platelets; Future    3. Iron deficiency anemia due to chronic blood loss  Will recheck labs after surgery  - Hemoglobin A1c; Future  - CBC with platelets; Future    4. Need for prophylactic vaccination and inoculation against influenza    - INFLUENZA VACCINE IM > 6 MONTHS VALENT IIV4 (AFLURIA/FLUZONE)      Return to clinic for annual exam and as needed.    Nina Lantigua MD

## 2021-11-01 ENCOUNTER — IMMUNIZATION (OUTPATIENT)
Dept: NURSING | Facility: CLINIC | Age: 36
End: 2021-11-01
Payer: COMMERCIAL

## 2021-11-01 PROCEDURE — 0004A PR COVID VAC PFIZER DIL RECON 30 MCG/0.3 ML IM: CPT

## 2021-11-01 PROCEDURE — 91300 PR COVID VAC PFIZER DIL RECON 30 MCG/0.3 ML IM: CPT

## 2021-11-03 ENCOUNTER — NURSE TRIAGE (OUTPATIENT)
Dept: NURSING | Facility: CLINIC | Age: 36
End: 2021-11-03

## 2021-11-03 NOTE — TELEPHONE ENCOUNTER
"Patient calling reporting that she had  pfizer booster on 11/01/2021.    Today her arm is swollen, red, (2 \"x 1.5\" inches at injection site), warm to touch, sensitive (3/10),  Denies fever today but is just concerned about injection site.    According to the protocol, patient should be able to care for this at home.  Care advice given. Patient verbalizes understanding and agrees with plan of care.     COVID 19 Nurse Triage Plan/Patient Instructions    Please be aware that novel coronavirus (COVID-19) may be circulating in the community. If you develop symptoms such as fever, cough, or SOB or if you have concerns about the presence of another infection including coronavirus (COVID-19), please contact your health care provider or visit [email or web address].     Disposition/Instructions    Home care recommended. Follow home care protocol based instructions.    Thank you for taking steps to prevent the spread of this virus.  o Limit your contact with others.  o Wear a simple mask to cover your cough.  o Wash your hands well and often.    Resources    [facility]: About COVID-19: [email or web address]    CDC: What to Do If You're Sick: www.cdc.gov/coronavirus/2019-ncov/about/steps-when-sick.html    CDC: Ending Home Isolation: www.cdc.gov/coronavirus/2019-ncov/hcp/disposition-in-home-patients.html     CDC: Caring for Someone: www.cdc.gov/coronavirus/2019-ncov/if-you-are-sick/care-for-someone.html     [facility]: Interim Guidance for Hospital Discharge to Home: www.health.[facility].[place].us/diseases/coronavirus/hcp/hospdischarge.pdf    [facility] clinical trials (COVID-19 research studies): [email or web address]/[email or web address]-clinical-trials     Below are the COVID-19 hotlines at the [place] Department of Health ([facility]). Interpreters are available.   o For health questions: Call 608-860-7009 or 1-452.101.9853 (7 a.m. to 7 p.m.)  o For questions about schools and childcare: Call 442-284-3995 or " 1-180.495.2806 (7 a.m. to 7 p.m.)                     Reason for Disposition    COVID-19 vaccine, injection site reaction (e.g., pain, redness, swelling), question about    Additional Information    Negative: [1] Difficulty breathing or swallowing AND [2] starts within 2 hours after injection    Negative: Sounds like a life-threatening emergency to the triager    Negative: [1] Symptoms of COVID-19 (e.g., cough, fever, SOB, or others) AND [2] within 14 days of EXPOSURE (close contact) with diagnosed or suspected COVID-19 patient    Negative: [1] Symptoms of COVID-19 (e.g., cough, fever, SOB, or others) AND [2] within 14 days of being at a crowded indoor or outdoor event (e.g., concert, festival, rally, wedding)    Negative: Typical COVID-19 symptoms (e.g., cough, difficulty breathing, loss of taste or smell, runny nose, sore throat) that are NOT expected from vaccine    Negative: [1] COVID-19 exposure AND [2] no symptoms, or symptoms not typical of COVID-19    Negative: Fever > 104 F (40 C)    Negative: Sounds like a severe, unusual reaction to the triager    Negative: [1] Redness or red streak around the injection site AND [2] started > 48 hours after getting vaccine AND [3] fever    Negative: [1] Fever > 101 F (38.3 C) AND [2] age > 60 years AND [3] started > 48 hours after getting vaccine    Negative: [1] Fever > 100.0 F (37.8 C) AND [2] bedridden (e.g., nursing home patient, CVA, chronic illness, recovering from surgery) AND [3] started > 48 hours after getting vaccine    Negative: [1] Fever > 100.0 F (37.8 C) AND [2] diabetes mellitus or weak immune system (e.g., HIV positive, cancer chemo, splenectomy, organ transplant, chronic steroids) AND [3] started > 48 hours after getting vaccine    Negative: Fever > 100.0 F (37.8 C) present > 3 days (72 hours)    Negative: [1] Fever > 100.0 F (37.8 C) AND [2] healthcare worker    Negative: [1] Redness around the injection site AND [2] started > 48 hours after getting  vaccine AND [3] no fever  (Exception: red area < 1 inch or 2.5 cm wide)    Negative: [1] Pain, tenderness, or swelling at the injection site AND [2] over 3 days (72 hours) since vaccine AND [3] getting worse    Negative: [1] Pain, tenderness, or swelling at the injection site AND [2] lasts > 7 days    Negative: [1] Lymph node swelling (i.e., armpit or neck on side of vaccine) AND [2] lasts > 3 weeks    Negative: [1] Requesting COVID-19 vaccine AND [2] healthcare worker (e.g., EMS first responders, doctors, nurses)    Negative: [1] Requesting COVID-19 vaccine AND [2] resident of a long-term care facility (e.g., nursing home)    Negative: [1] Requesting COVID-19 vaccine AND [2] vaccine available in the community for this patient group    Protocols used: CORONAVIRUS (COVID-19) VACCINE QUESTIONS AND [id number]-MI 8.25.2021

## 2021-12-04 ENCOUNTER — HEALTH MAINTENANCE LETTER (OUTPATIENT)
Age: 36
End: 2021-12-04

## 2022-09-11 ENCOUNTER — HEALTH MAINTENANCE LETTER (OUTPATIENT)
Age: 37
End: 2022-09-11

## 2022-10-09 ENCOUNTER — MYC MEDICAL ADVICE (OUTPATIENT)
Dept: OBGYN | Facility: CLINIC | Age: 37
End: 2022-10-09

## 2022-10-09 DIAGNOSIS — Z00.00 ROUTINE HEALTH MAINTENANCE: Primary | ICD-10-CM

## 2022-10-10 NOTE — TELEPHONE ENCOUNTER
Pattie has annual visit with SK 11/8/2022    Would like option to have labs done ahead of time     Pended for provider review

## 2022-10-14 ENCOUNTER — LAB (OUTPATIENT)
Dept: LAB | Facility: CLINIC | Age: 37
End: 2022-10-14
Payer: COMMERCIAL

## 2022-10-14 ENCOUNTER — IMMUNIZATION (OUTPATIENT)
Dept: FAMILY MEDICINE | Facility: CLINIC | Age: 37
End: 2022-10-14
Payer: COMMERCIAL

## 2022-10-14 DIAGNOSIS — Z00.00 ROUTINE HEALTH MAINTENANCE: ICD-10-CM

## 2022-10-14 DIAGNOSIS — Z23 ENCOUNTER FOR IMMUNIZATION: Primary | ICD-10-CM

## 2022-10-14 LAB
ERYTHROCYTE [DISTWIDTH] IN BLOOD BY AUTOMATED COUNT: 12.7 % (ref 10–15)
HBA1C MFR BLD: 5.5 % (ref 0–5.6)
HCT VFR BLD AUTO: 38.9 % (ref 35–47)
HGB BLD-MCNC: 12.7 G/DL (ref 11.7–15.7)
MCH RBC QN AUTO: 28.5 PG (ref 26.5–33)
MCHC RBC AUTO-ENTMCNC: 32.6 G/DL (ref 31.5–36.5)
MCV RBC AUTO: 87 FL (ref 78–100)
PLATELET # BLD AUTO: 281 10E3/UL (ref 150–450)
RBC # BLD AUTO: 4.46 10E6/UL (ref 3.8–5.2)
WBC # BLD AUTO: 7.8 10E3/UL (ref 4–11)

## 2022-10-14 PROCEDURE — 83036 HEMOGLOBIN GLYCOSYLATED A1C: CPT

## 2022-10-14 PROCEDURE — 90686 IIV4 VACC NO PRSV 0.5 ML IM: CPT

## 2022-10-14 PROCEDURE — 0134A COVID-19,PF,MODERNA BIVALENT: CPT

## 2022-10-14 PROCEDURE — 80061 LIPID PANEL: CPT

## 2022-10-14 PROCEDURE — 85027 COMPLETE CBC AUTOMATED: CPT

## 2022-10-14 PROCEDURE — 99207 PR NO CHARGE NURSE ONLY: CPT

## 2022-10-14 PROCEDURE — 90471 IMMUNIZATION ADMIN: CPT

## 2022-10-14 PROCEDURE — 91313 COVID-19,PF,MODERNA BIVALENT: CPT

## 2022-10-14 PROCEDURE — 36415 COLL VENOUS BLD VENIPUNCTURE: CPT

## 2022-10-14 NOTE — PROGRESS NOTES
Prior to immunization administration, verified patients identity using patient s name and date of birth. Please see Immunization Activity for additional information.     Screening Questionnaire for Adult Immunization    Are you sick today?   No   Do you have allergies to medications, food, a vaccine component or latex?   Yes   Have you ever had a serious reaction after receiving a vaccination?   No   Do you have a long-term health problem with heart, lung, kidney, or metabolic disease (e.g., diabetes), asthma, a blood disorder, no spleen, complement component deficiency, a cochlear implant, or a spinal fluid leak?  Are you on long-term aspirin therapy?   No   Do you have cancer, leukemia, HIV/AIDS, or any other immune system problem?   No   Do you have a parent, brother, or sister with an immune system problem?   No   In the past 3 months, have you taken medications that affect  your immune system, such as prednisone, other steroids, or anticancer drugs; drugs for the treatment of rheumatoid arthritis, Crohn s disease, or psoriasis; or have you had radiation treatments?   No   Have you had a seizure, or a brain or other nervous system problem?   No   During the past year, have you received a transfusion of blood or blood    products, or been given immune (gamma) globulin or antiviral drug?   No   For women: Are you pregnant or is there a chance you could become       pregnant during the next month?   No   Have you received any vaccinations in the past 4 weeks?   No     Immunization questionnaire was positive for at least one answer.  Notified Dr. Wallace.        Per orders of Dr. Wallace, injection of Moderna Bivalent and Flu given by Leticia Barragan MA. Patient instructed to remain in clinic for 15 minutes afterwards, and to report any adverse reaction to me immediately.       Screening performed by Leticia Barragan MA on 10/14/2022 at 9:56 AM.

## 2022-10-15 LAB
CHOLEST SERPL-MCNC: 221 MG/DL
FASTING STATUS PATIENT QL REPORTED: YES
HDLC SERPL-MCNC: 66 MG/DL
LDLC SERPL CALC-MCNC: 132 MG/DL
NONHDLC SERPL-MCNC: 155 MG/DL
TRIGL SERPL-MCNC: 114 MG/DL

## 2022-11-08 ENCOUNTER — OFFICE VISIT (OUTPATIENT)
Dept: OBGYN | Facility: CLINIC | Age: 37
End: 2022-11-08
Payer: COMMERCIAL

## 2022-11-08 VITALS
BODY MASS INDEX: 35.53 KG/M2 | WEIGHT: 207 LBS | SYSTOLIC BLOOD PRESSURE: 101 MMHG | DIASTOLIC BLOOD PRESSURE: 71 MMHG | OXYGEN SATURATION: 100 % | HEART RATE: 70 BPM

## 2022-11-08 DIAGNOSIS — E78.9 LIPID DISORDER: ICD-10-CM

## 2022-11-08 DIAGNOSIS — Z00.00 ANNUAL PHYSICAL EXAM: Primary | ICD-10-CM

## 2022-11-08 PROCEDURE — 99395 PREV VISIT EST AGE 18-39: CPT | Performed by: OBSTETRICS & GYNECOLOGY

## 2022-11-08 NOTE — PROGRESS NOTES
Aure is a 37 year old  female who presents for annual exam.     Menses are N/A and  days. Patient's last menstrual period was 2021 (approximate).. Using hysterectomy for contraception.  She is not currently considering pregnancy.  Besides routine health maintenance, she has no other health concerns today .  Not seeing naturopath, too expensive.  Realized she's spent about 15,000 for that care and it's just too much.  However, has noticed she feels overwhelming fatigue if not taknig adrenal supplement.  Trying to find a helpful primary care provider that would still be covered by her insurance.  Back pain better s/p hyst.  No vaginal discharge or bleeding.  Energy is so much better.  So happy that she decided to get the hysterectomy  GYNECOLOGIC HISTORY:  Menarche: 10  Number of lifetime partners: >6  Aure is not sexually active.  History sexually transmitted infections:No STD history  STI testing offered?  Declined  DONALD exposure: No  History of abnormal Pap smear: NO - age 30- 65 PAP every 3 years recommended  Family history of breast CA: Unknown  Family history of uterine/ovarian CA: Unknown    Family history of colon CA: Unknown    HEALTH MAINTENANCE:  Cholesterol: (  Cholesterol   Date Value Ref Range Status   10/14/2022 221 (H) <200 mg/dL Final   2021 196 <200 mg/dL Final   2019 250 (H) <200 mg/dL Final     Comment:     Desirable:       <200 mg/dl    History of abnormal lipids: Yes  Mammo: N/A . History of abnormal Mammo: YES, No, Not applicable.  Regular Self Breast Exams: Yes  Calcium/Vitamin D intake: source:  Both. Adequate? Yes  TSH: (  TSH   Date Value Ref Range Status   2021 1.88 0.40 - 4.00 mU/L Final    )  Pap; (  Lab Results   Component Value Date    PAP NIL 2018    )    HISTORY:  OB History    Para Term  AB Living   0 0 0 0 0 0   SAB IAB Ectopic Multiple Live Births   0 0 0 0 0     Past Medical History:   Diagnosis Date     Bulging lumbar disc      dx clinically chiro- given voltaren gel     Diabetes (H) 9/2020    no longer diabetic as of 2/2021     Migraine aura without headache     woozy, spacy, blurry vision, dry mouth- when weather changes, started since age ~25 yrs     Obesity      Seasonal allergic rhinitis     claritin, benadryl if really needed (nose bleeds on nasal sprays)     Tension headache      Uncomplicated asthma 1996?     Past Surgical History:   Procedure Laterality Date     DILATION AND CURETTAGE      uterine polyps, '02 and '08     HYSTERECTOMY, PAP NO LONGER INDICATED       LAPAROSCOPIC HYSTERECTOMY TOTAL, SALPINGECTOMY BILATERAL Bilateral 08/09/2021    Procedure: HYSTERECTOMY, TOTAL, LAPAROSCOPIC, WITH BILATERAL SALPINGECTOMY;  Surgeon: Nina Lantigua MD;  Location: [facility] OR     wisdom teeth  2002     Family History   Adopted: Yes   Problem Relation Age of Onset     Medical History Unknown Other         from [place]     Social History     Socioeconomic History     Marital status:      Spouse name: None     Number of children: None     Years of education: None     Highest education level: None   Occupational History     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   Tobacco Use     Smoking status: Never     Smokeless tobacco: Never   Vaping Use     Vaping Use: Never used   Substance and Sexual Activity     Alcohol use: Yes     Alcohol/week: 1.0 standard drink     Comment: rarely     Drug use: No     Sexual activity: Not Currently     Partners: Male       Current Outpatient Medications:      albuterol (PROAIR HFA/PROVENTIL HFA/VENTOLIN HFA) 108 (90 Base) MCG/ACT Inhaler, Inhale 2 puffs into the lungs every 6 hours as needed for shortness of breath / dyspnea or wheezing, Disp: 1 Inhaler, Rfl: 1     diphenhydrAMINE (BENADRYL) 25 MG capsule, Take 25 mg by mouth every 6 hours as needed for itching or allergies, Disp: , Rfl:      loratadine (CLARITIN) 10 MG tablet, Take 1 tablet (10 mg) by mouth daily, Disp: 30 tablet, Rfl: 1      CALCIUM-MAGNESIUM-ZINC PO, , Disp: , Rfl:      magnesium 250 MG tablet, Take 1 tablet by mouth daily, Disp: , Rfl:      VITAMIN D PO, Take 2,000 Int'l Units by mouth, Disp: , Rfl:      Allergies   Allergen Reactions     Amoxicillin Anaphylaxis     Benzoyl Peroxide      PN: LW Reaction: swelling     Doxycycline      PN: LW Reaction: RESPIRATORY DISTRESS     Fexofenadine Difficulty breathing     Hpv 9-Valent Recomb Vaccine [Quadrivalent Hpv, 6,11,16,18] Difficulty breathing     Minocycline      PN: LW Reaction: RESPIRATORY DISTRESS     Penicillins Rash     Sulfa Drugs Unknown and Rash     As a baby       Past medical, surgical, social and family history were reviewed and updated in EPIC.    ROS:   C:     NEGATIVE for fever, chills, change in weight  I:       NEGATIVE for worrisome rashes, moles or lesions  E:     NEGATIVE for vision changes or irritation  E/M: NEGATIVE for ear, mouth and throat problems  R:     NEGATIVE for significant cough or SOB  CV:   NEGATIVE for chest pain, palpitations or peripheral edema  GI:     NEGATIVE for nausea, abdominal pain, heartburn, or change in bowel habits  :   NEGATIVE for frequency, dysuria, hematuria, vaginal discharge, or irregular bleeding  M:     NEGATIVE for significant arthralgias or myalgia  N:      NEGATIVE for weakness, dizziness or paresthesias  E:      NEGATIVE for temperature intolerance, skin/hair changes  P:      NEGATIVE for changes in mood or affect.    EXAM:  /71   Pulse 70   Wt 93.9 kg (207 lb)   LMP 08/03/2021 (Approximate)   SpO2 100%   BMI 35.53 kg/m     BMI: Body mass index is 35.53 kg/m .  Constitutional: healthy, alert and no distress  Head: Normocephalic. No masses, lesions, tenderness or abnormalities  Neck: Neck supple. Trachea midline. No adenopathy. Thyroid symmetric, normal size.   Cardiovascular: RRR.   Respiratory: Negative.   Breast: No nodularity, asymmetry or nipple discharge bilaterally.  Gastrointestinal: Abdomen soft,  non-tender, non-distended. No masses, organomegaly.  :  Vulva:  No external lesions, normal female hair distribution, no inguinal adenopathy.    Urethra:  Midline, non-tender, well supported, no discharge  Vagina:  Moist, pink, no abnormal discharge, no lesions  Uterus:  Surgically absent.  Cuff intact  Ovaries:  No masses appreciated, non-tender, mobile  Rectal Exam: deferred  Musculoskeletal: extremities normal  Skin: no suspicious lesions or rashes  Psychiatric: Affect appropriate, cooperative,mentation appears normal.     COUNSELING:   Reviewed preventive health counseling, as reflected in patient instructions   reports that she has never smoked. She has never used smokeless tobacco.    Body mass index is 35.53 kg/m .  Weight management plan: Discussed weight management clinic and 24 week healthy lifestyle program at   FRAX Risk Assessment    ASSESSMENT:  37 year old female with satisfactory annual exam  (Z00.00) Annual physical exam  (primary encounter diagnosis)  Comment: No need for paps any longer.  Reviewed labs obtained prior to today's visit.  A1c and hgb normal.  Believe I heard there's a doc who does some functional medicine at , so will discuss with a partner if she knows who that is.    (E78.9) Lipid disorder  Comment: Recommended diet and lifestyle changes, as well as reviewing with a primary provider to discuss at what point they'd recommend medical management      RTC 1 year and prn.    Nina Lantigua MD

## 2023-12-16 ENCOUNTER — HEALTH MAINTENANCE LETTER (OUTPATIENT)
Age: 38
End: 2023-12-16

## 2024-01-26 ENCOUNTER — OFFICE VISIT (OUTPATIENT)
Dept: OBGYN | Facility: CLINIC | Age: 39
End: 2024-01-26
Payer: COMMERCIAL

## 2024-01-26 VITALS — HEIGHT: 64 IN | HEART RATE: 85 BPM | BODY MASS INDEX: 36.19 KG/M2 | WEIGHT: 212 LBS | OXYGEN SATURATION: 95 %

## 2024-01-26 DIAGNOSIS — Z00.00 ANNUAL PHYSICAL EXAM: Primary | ICD-10-CM

## 2024-01-26 DIAGNOSIS — N95.1 VASOMOTOR SYMPTOMS DUE TO MENOPAUSE: ICD-10-CM

## 2024-01-26 DIAGNOSIS — E66.812 CLASS 2 OBESITY WITH BODY MASS INDEX (BMI) OF 36.0 TO 36.9 IN ADULT, UNSPECIFIED OBESITY TYPE, UNSPECIFIED WHETHER SERIOUS COMORBIDITY PRESENT: ICD-10-CM

## 2024-01-26 LAB
ALBUMIN SERPL BCG-MCNC: 4.3 G/DL (ref 3.5–5.2)
ALP SERPL-CCNC: 67 U/L (ref 40–150)
ALT SERPL W P-5'-P-CCNC: 26 U/L (ref 0–50)
AST SERPL W P-5'-P-CCNC: 25 U/L (ref 0–45)
BILIRUB DIRECT SERPL-MCNC: <0.2 MG/DL (ref 0–0.3)
BILIRUB SERPL-MCNC: 0.3 MG/DL
CHOLEST SERPL-MCNC: 236 MG/DL
ERYTHROCYTE [DISTWIDTH] IN BLOOD BY AUTOMATED COUNT: 12.5 % (ref 10–15)
FASTING STATUS PATIENT QL REPORTED: YES
FERRITIN SERPL-MCNC: 130 NG/ML (ref 6–175)
FSH SERPL IRP2-ACNC: 5.8 MIU/ML
HBA1C MFR BLD: 5.9 % (ref 0–5.6)
HCT VFR BLD AUTO: 40.5 % (ref 35–47)
HDLC SERPL-MCNC: 58 MG/DL
HGB BLD-MCNC: 12.9 G/DL (ref 11.7–15.7)
HOLD SPECIMEN: NORMAL
IRON BINDING CAPACITY (ROCHE): 336 UG/DL (ref 240–430)
IRON SATN MFR SERPL: 24 % (ref 15–46)
IRON SERPL-MCNC: 82 UG/DL (ref 37–145)
LDLC SERPL CALC-MCNC: 155 MG/DL
MCH RBC QN AUTO: 28 PG (ref 26.5–33)
MCHC RBC AUTO-ENTMCNC: 31.9 G/DL (ref 31.5–36.5)
MCV RBC AUTO: 88 FL (ref 78–100)
NONHDLC SERPL-MCNC: 178 MG/DL
PLATELET # BLD AUTO: 262 10E3/UL (ref 150–450)
PROT SERPL-MCNC: 7.7 G/DL (ref 6.4–8.3)
RBC # BLD AUTO: 4.61 10E6/UL (ref 3.8–5.2)
TRIGL SERPL-MCNC: 117 MG/DL
TSH SERPL DL<=0.005 MIU/L-ACNC: 2.84 UIU/ML (ref 0.3–4.2)
WBC # BLD AUTO: 7.3 10E3/UL (ref 4–11)

## 2024-01-26 PROCEDURE — 83550 IRON BINDING TEST: CPT | Performed by: OBSTETRICS & GYNECOLOGY

## 2024-01-26 PROCEDURE — 99213 OFFICE O/P EST LOW 20 MIN: CPT | Mod: 25 | Performed by: OBSTETRICS & GYNECOLOGY

## 2024-01-26 PROCEDURE — 83001 ASSAY OF GONADOTROPIN (FSH): CPT | Performed by: OBSTETRICS & GYNECOLOGY

## 2024-01-26 PROCEDURE — 80076 HEPATIC FUNCTION PANEL: CPT | Performed by: OBSTETRICS & GYNECOLOGY

## 2024-01-26 PROCEDURE — 36415 COLL VENOUS BLD VENIPUNCTURE: CPT | Performed by: OBSTETRICS & GYNECOLOGY

## 2024-01-26 PROCEDURE — 99396 PREV VISIT EST AGE 40-64: CPT | Performed by: OBSTETRICS & GYNECOLOGY

## 2024-01-26 PROCEDURE — 82728 ASSAY OF FERRITIN: CPT | Performed by: OBSTETRICS & GYNECOLOGY

## 2024-01-26 PROCEDURE — 85027 COMPLETE CBC AUTOMATED: CPT | Performed by: OBSTETRICS & GYNECOLOGY

## 2024-01-26 PROCEDURE — 84443 ASSAY THYROID STIM HORMONE: CPT | Performed by: OBSTETRICS & GYNECOLOGY

## 2024-01-26 PROCEDURE — 83540 ASSAY OF IRON: CPT | Performed by: OBSTETRICS & GYNECOLOGY

## 2024-01-26 PROCEDURE — 83036 HEMOGLOBIN GLYCOSYLATED A1C: CPT | Performed by: OBSTETRICS & GYNECOLOGY

## 2024-01-26 PROCEDURE — 80061 LIPID PANEL: CPT | Performed by: OBSTETRICS & GYNECOLOGY

## 2024-01-26 NOTE — PROGRESS NOTES
"Aure is a 38 year old  who presents for annual exam.   Had hysterectomy in .  She is having hot flashes, mild, night sweats. No vaginal bleeding noted.     Besides routine health maintenance, she has no other health concerns today .  Feels like she still has symptoms of PCOS.  Difficulty losing weight, skin tags, dark spots, hair thinning.  Has been doing acupuncture for weight loss.  Was on adrenal support from [facility] in the past and did better with that, lost weight, etc.  Would like to lose weight.  Doesn't know much about new weight loss meds.  Hesitant, as she took something before that made her feel really anxious, depressed, couldn't sleep, etc.  GYNECOLOGIC HISTORY:  Menarche: 10   Age at first intercourse: 17 Number of lifetime partners: more than 6  She is not sexually active with 0male partner(s) and she is not currently in monogamous relationship with 0.    History sexually transmitted infections:No STD history  STI testing offered?  Declined  Estrogen replacement therapy: No  DONALD exposure: No    History of abnormal Pap smear: NO - pap no longer indicated.  Family history of breast CA: No  Family history of uterine/ovarian CA: No  Family history of colon CA: No    HEALTH MAINTENANCE:  Cholesterol: (No components found for: \"CHOL2\" ) History of abnormal lipids: Yes  Mammo: 0 . History of abnormal Mammo: No  Regular Self Breast Exams: Yes  Colonoscopy:  History of abnormal Colonoscopy: No  Dexa:  History of abnormal Dexa: No  Calcium/Vitamin D intake: source:  dairy, dietary supplement(s) Adequate? Yes  TSH: (No components found for: \"TSH1\" )  Pap; (  Lab Results   Component Value Date    PAP NIL 2018    )    HISTORY:  OB History    Para Term  AB Living   0 0 0 0 0 0   SAB IAB Ectopic Multiple Live Births   0 0 0 0 0     Past Medical History:   Diagnosis Date    Bulging lumbar disc     dx clinically [facility]- given voltaren gel    Diabetes (H) 2020    no longer diabetic as of " 2/2021    Migraine aura without headache     woozy, spacy, blurry vision, dry mouth- when weather changes, started since age ~25 yrs    Obesity     Seasonal allergic rhinitis     claritin, benadryl if really needed (nose bleeds on nasal sprays)    Tension headache     Uncomplicated asthma 1996?     Past Surgical History:   Procedure Laterality Date    DILATION AND CURETTAGE      uterine polyps, '02 and '08    HYSTERECTOMY, PAP NO LONGER INDICATED      LAPAROSCOPIC HYSTERECTOMY TOTAL, SALPINGECTOMY BILATERAL Bilateral 08/09/2021    Procedure: HYSTERECTOMY, TOTAL, LAPAROSCOPIC, WITH BILATERAL SALPINGECTOMY;  Surgeon: Nina Lantigua MD;  Location: [facility] OR    wisdom teeth  2002     Family History   Adopted: Yes   Problem Relation Age of Onset    Medical History Unknown Other         from [place]     Social History     Socioeconomic History    Marital status:      Spouse name: None    Number of children: None    Years of education: None    Highest education level: None   Occupational History     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   Tobacco Use    Smoking status: Never    Smokeless tobacco: Never   Vaping Use    Vaping Use: Never used   Substance and Sexual Activity    Alcohol use: Yes     Alcohol/week: 1.0 standard drink of alcohol     Comment: rarely    Drug use: No    Sexual activity: Not Currently     Partners: Male       Current Outpatient Medications:     albuterol (PROAIR HFA/PROVENTIL HFA/VENTOLIN HFA) 108 (90 Base) MCG/ACT Inhaler, Inhale 2 puffs into the lungs every 6 hours as needed for shortness of breath / dyspnea or wheezing, Disp: 1 Inhaler, Rfl: 1    diphenhydrAMINE (BENADRYL) 25 MG capsule, Take 25 mg by mouth every 6 hours as needed for itching or allergies, Disp: , Rfl:     Fezolinetant 45 MG TABS, Take 1 tablet by mouth daily, Disp: 90 tablet, Rfl: 4    loratadine (CLARITIN) 10 MG tablet, Take 1 tablet (10 mg) by mouth daily, Disp: 30 tablet, Rfl: 1    CALCIUM-MAGNESIUM-ZINC PO, ,  "Disp: , Rfl:     magnesium 250 MG tablet, Take 1 tablet by mouth daily (Patient not taking: Reported on 1/26/2024), Disp: , Rfl:     VITAMIN D PO, Take 2,000 Int'l Units by mouth (Patient not taking: Reported on 1/26/2024), Disp: , Rfl:      Allergies   Allergen Reactions    Amoxicillin Anaphylaxis    Benzoyl Peroxide      PN: LW Reaction: swelling    Doxycycline      PN: LW Reaction: RESPIRATORY DISTRESS    Fexofenadine Difficulty breathing    Hpv 9-Valent Recomb Vaccine [Hpv 4-Valent Vaccine Recombinant Vaccine] Difficulty breathing    Minocycline      PN: LW Reaction: RESPIRATORY DISTRESS    Penicillins Rash    Sulfa Antibiotics Unknown and Rash     As a baby       Past medical, surgical, social and family history were reviewed and updated in EPIC.    ROS:   C:       POS night sweats.  NEGATIVE for fever, chills, change in weight  I:         NEGATIVE for worrisome rashes, moles or lesions  E:       NEGATIVE for vision changes or irritation  E/M:   NEGATIVE for ear, mouth and throat problems  R:       NEGATIVE for significant cough or SOB  CV:     NEGATIVE for chest pain, palpitations or peripheral edema  GI:      NEGATIVE for nausea, abdominal pain, heartburn, or change in bowel habits  :    NEGATIVE for frequency, dysuria, hematuria, vaginal discharge, or bleeding  M:       NEGATIVE for significant arthralgias or myalgia  N:       NEGATIVE for weakness, dizziness or paresthesias  E:       NEGATIVE for temperature intolerance, skin/hair changes  P:       NEGATIVE for changes in mood or affect    EXAM:  Pulse 85   Ht 1.626 m (5' 4\")   Wt 96.2 kg (212 lb)   LMP 08/03/2021 (Approximate)   SpO2 95%   BMI 36.39 kg/m     BMI: Body mass index is 36.39 kg/m .  Constitutional: healthy, alert and no distress  Head: Normocephalic. No masses, lesions, tenderness or abnormalities  Neck: Neck supple. Trachea midline. No adenopathy. Thyroid symmetric, normal size.   Cardiovascular: RRR.   Respiratory: Negative.   Breast: " No nodularity, asymmetry or nipple discharge bilaterally.  Gastrointestinal: Abdomen soft, non-tender, non-distended. No masses, organomegaly  :  deferred  Rectal Exam: deferred  Musculoskeletal: extremities normal  Skin: no suspicious lesions or rashes  Psychiatric: Affect appropriate, cooperative,mentation appears normal.     COUNSELING:   Reviewed preventive health counseling, as reflected in patient instructions  Special attention given to:        Regular exercise       Healthy diet/nutrition       (Silvia)menopause management   reports that she has never smoked. She has never used smokeless tobacco.    Body mass index is 36.39 kg/m .  Weight management plan: Will look into insurance coverage re weight loss meds    FRAX Risk Assessment  ASSESSMENT:  38 year old  with satisfactory annual exam  (Z00.00) Annual physical exam  (primary encounter diagnosis)  Comment: Routine labs today  Plan: Lipid Profile, TSH with free T4 reflex,         Hemoglobin A1c, CBC with Platelets and Reflex         to Iron Studies, Iron & Iron Binding Capacity,         Ferritin    (N95.1) Vasomotor symptoms due to menopause  Comment: Still has ovaries, but having symptoms of (silvia) menopause.  Not a candidate for estrogen given history of HA with estrogen and hyperlipidemia, so will try prior authorization for Veozah.  LFTs checked for eval for that med and will need to check in 3 months  Plan: Follicle stimulating hormone, Fezolinetant 45         MG TABS, Hepatic panel (Albumin, ALT, AST,         Bili, Alk Phos, TP), MA Screening Digital         Bilateral    (E66.9,  Z68.36) Class 2 obesity with body mass index (BMI) of 36.0 to 36.9 in adult, unspecified obesity type, unspecified whether serious comorbidity present  Comment: Reviewed different weight loss meds briefly.  May be of benefit to her if insurance covers.  She'll look into it and let me know if she wants referral    RTC 1 year and prn.    Nina Lantigua MD

## 2024-01-26 NOTE — PATIENT INSTRUCTIONS
[employer]:  [phone]    Look into insurance coverage for weight loss meds:  Wegovy and Zepbound (injection) or Saxenda (oral)

## 2024-01-29 ENCOUNTER — TELEPHONE (OUTPATIENT)
Dept: OBGYN | Facility: CLINIC | Age: 39
End: 2024-01-29

## 2024-01-29 NOTE — TELEPHONE ENCOUNTER
Retail Pharmacy Prior Authorization Team   Phone: [phone]    PRIOR AUTHORIZATION DENIED    Medication: VEOZAH 45 MG PO TABS  Insurance Company: [employer] [place] - Phone [phone] Fax [phone]  Denial Date: 1/28/2024  Denial Reason(s): MUST TRY/FAIL THREE FORMULARY ALTERNATIVES - ESTRADIOL TOPICAL GEL, ESTRADIOL BI-WEEKLY PATCHES, AND JINTELI TABS      Appeal Information: IF PROVIDER WOULD LIKE TO APPEAL THIS DECISION PLEASE PROVIDE THE PA TEAM WITH A LETTER OF MEDICAL NECESSITY      Patient Notified: No

## 2024-01-29 NOTE — LETTER
2024    INSURER: Payor: [employer] / Plan: [employer] OF [place] / Product Type: Indemnity /   Re: Prior Authorization Request  Patient: Aure Kolb  Policy ID#:  [id number]  : 1985      To Whom it May Concern:    I am writing to formally request a prior authorization of coverage for my patient,  Aure Kolb, for treatment using VEOZAH 45 MG PO TABS.      The patient has taken estrogen in the past in the form of a combination oral contraceptive pill and had significant side effects of headaches. For that reason, along with her diagnosis of hyperlipidemia, estrogen is not recommended for her in any form.    The benefits of the therapy include improving patient symptoms of silvia-menopause.    I firmly believe that this therapy is clinically appropriate and that Aure Kolb would benefit from improved quality of life if allowed the opportunity to receive this treatment.  Please contact me at Dept: [phone] if you require additional information to ensure the prompt approval for coverage.      Sincerely,      Nina Lantigua MD

## 2024-02-01 NOTE — TELEPHONE ENCOUNTER
Patient has taken estrogen in the past in the form of a combination oral contraceptive pill and had significant side effects of headaches.  For that reason, estrogen isn't recommended for her in any form.  Would like to appeal denial based on that information.    Nina Lantigua MD

## 2024-02-08 NOTE — TELEPHONE ENCOUNTER
Medication Appeal Initiation    Medication: VEOZAH 45 MG PO TABS  Appeal Start Date:  2/8/2024  Insurance Company: [employer] [place]  Insurance Phone:   Insurance Fax: [phone]  Comments:       FAXED LETTER OF MEDICAL NECESSITY AND CHART NOTES TO INSURANCE -

## 2024-02-22 NOTE — TELEPHONE ENCOUNTER
Called pt to let them know insurance denied our appeal for her vegozah.  Is open to idea if Dr. Lantigua has any other options she can think of but otherwise understands her insurance won't cover much and with her hx.  Pt will reach out to us if she needs anything in the meantime.  Tamica Dumont RN on 2/22/2024 at 4:02 PM

## 2024-02-22 NOTE — TELEPHONE ENCOUNTER
MEDICATION APPEAL DENIED    Medication: VEOZAH 45 MG PO TABS  Insurance Company: [employer] [place]  Denial Date: 2/20/2024  Denial Reason(s):       Second Level Appeal Information:     Patient Notified: No  Central Prior Authorization Team ONLY: Second level appeals will be managed by the clinic staff and provider. Please contact the [email or web address] Prior Authorization Team if additional information about the denial is needed.

## 2024-02-22 NOTE — TELEPHONE ENCOUNTER
PRIOR AUTHORIZATION FOLLOW-UP  Per Tali at [facility] - they have received the request. Initial decision was upheld. She will refax the denial letter.

## 2024-02-24 ENCOUNTER — HEALTH MAINTENANCE LETTER (OUTPATIENT)
Age: 39
End: 2024-02-24

## 2024-09-25 ENCOUNTER — IMMUNIZATION (OUTPATIENT)
Dept: FAMILY MEDICINE | Facility: CLINIC | Age: 39
End: 2024-09-25
Payer: COMMERCIAL

## 2024-09-25 DIAGNOSIS — Z23 ENCOUNTER FOR IMMUNIZATION: Primary | ICD-10-CM

## 2024-09-25 PROCEDURE — 90471 IMMUNIZATION ADMIN: CPT

## 2024-09-25 PROCEDURE — 91320 SARSCV2 VAC 30MCG TRS-SUC IM: CPT

## 2024-09-25 PROCEDURE — 90656 IIV3 VACC NO PRSV 0.5 ML IM: CPT

## 2024-09-25 PROCEDURE — 99207 PR NO CHARGE NURSE ONLY: CPT

## 2024-09-25 PROCEDURE — 90480 ADMN SARSCOV2 VAC 1/ONLY CMP: CPT

## 2024-09-25 NOTE — PROGRESS NOTES
Prior to immunization administration, verified patients identity using patient s name and date of birth. Please see Immunization Activity for additional information.     Is the patient's temperature normal (100.5 or less)? No     DO NOT VACCINATE. All vaccines should be delayed until the illness has improved. Antibiotics are not a contraindication.           9/25/2024   COVID   Have you had myocarditis or pericarditis (inflammation of or around the heart muscle) after getting a COVID-19 vaccine? No   Have you had a serious reaction to a COVID vaccine or something in a COVID vaccine, like polyethylene glycol (PEG) or polysorbate? No   Have you had multisystem inflammatory syndrome from COVID-19 in the past 90 days? No            Patient MEETS CRITERIA. PROCEED with vaccine administration.        9/25/2024   INFLUENZA   Would you like to receive the flu shot or the nasal flu vaccine today? Flu Shot   Have you had a serious reaction to a flu vaccine or something in a flu vaccine? No   Have you had Guillain-Riverside syndrome within 6 weeks of getting a vaccine? No   Have you received a bone marrow transplant within the previous 6 months? No            Patient MEETS CRITERIA. PROCEED with vaccine administration.        Patient instructed to remain in clinic for 15 minutes afterwards, and to report any adverse reactions.      Link to Ancillary Visit Immunization Standing Orders SmartSet     Screening performed by Deisy Oliveros MA on 9/25/2024 at 9:25 AM.

## 2024-11-11 ENCOUNTER — LAB (OUTPATIENT)
Dept: LAB | Facility: CLINIC | Age: 39
End: 2024-11-11
Payer: COMMERCIAL

## 2024-11-11 DIAGNOSIS — Z00.00 ROUTINE HEALTH MAINTENANCE: ICD-10-CM

## 2024-11-11 LAB
CHOLEST SERPL-MCNC: 271 MG/DL
EST. AVERAGE GLUCOSE BLD GHB EST-MCNC: 114 MG/DL
FASTING STATUS PATIENT QL REPORTED: YES
HBA1C MFR BLD: 5.6 % (ref 0–5.6)
HDLC SERPL-MCNC: 72 MG/DL
LDLC SERPL CALC-MCNC: 166 MG/DL
NONHDLC SERPL-MCNC: 199 MG/DL
TRIGL SERPL-MCNC: 165 MG/DL

## 2024-11-11 PROCEDURE — 36415 COLL VENOUS BLD VENIPUNCTURE: CPT

## 2024-11-11 PROCEDURE — 80061 LIPID PANEL: CPT

## 2024-11-11 PROCEDURE — 83036 HEMOGLOBIN GLYCOSYLATED A1C: CPT

## 2024-11-18 ENCOUNTER — HOSPITAL ENCOUNTER (OUTPATIENT)
Dept: MAMMOGRAPHY | Facility: CLINIC | Age: 39
Discharge: HOME | End: 2024-11-18
Attending: OBSTETRICS & GYNECOLOGY | Admitting: OBSTETRICS & GYNECOLOGY
Payer: COMMERCIAL

## 2024-11-18 DIAGNOSIS — Z12.31 VISIT FOR SCREENING MAMMOGRAM: ICD-10-CM

## 2024-11-18 PROCEDURE — 77067 SCR MAMMO BI INCL CAD: CPT

## 2024-11-18 PROCEDURE — 77063 BREAST TOMOSYNTHESIS BI: CPT

## 2025-03-09 ENCOUNTER — HEALTH MAINTENANCE LETTER (OUTPATIENT)
Age: 40
End: 2025-03-09

## (undated) DEVICE — PREP POVIDONE IODINE SOLUTION 10% 4OZ BOTTLE 29906-004

## (undated) DEVICE — Device

## (undated) DEVICE — SU VICRYL 0 CT-2 27" J334H

## (undated) DEVICE — TUBING SUCTION MEDI-VAC 1/4"X20' N620A

## (undated) DEVICE — ESU LIGASURE LAPAROSCOPIC BLUNT TIP SEALER 5MMX37CM LF1837

## (undated) DEVICE — PAD CHUX UNDERPAD 30X36" P3036C

## (undated) DEVICE — WIPES FOLEY CARE SURESTEP PROVON DFC100

## (undated) DEVICE — ESU HANDPIECE OLYMPUS PK SPATULA PK-SP0533

## (undated) DEVICE — TUBING SMOKE EVAC PNEUVIEW 9660-XE

## (undated) DEVICE — RETR ELEV / UTERINE MANIPULATOR V-CARE MED CUP 60-6085-201A

## (undated) DEVICE — CATH TRAY FOLEY SURESTEP 16FR WDRAIN BAG STLK LATEX A300316A

## (undated) DEVICE — PAD PERI INDIV WRAP 11" 2022A

## (undated) DEVICE — SUCTION IRR STRYKERFLOW II W/TIP 250-070-520

## (undated) DEVICE — ESU GROUND PAD UNIVERSAL W/O CORD

## (undated) DEVICE — SUCTION TIP YANKAUER W/O VENT K86

## (undated) DEVICE — GLOVE PROTEXIS W/NEU-THERA 6.5  2D73TE65

## (undated) DEVICE — SU MONOCRYL 4-0 PS-2 18" UND Y496G

## (undated) DEVICE — PANTIES MESH LG/XLG 2PK 706M2

## (undated) DEVICE — LINEN GOWN X4 5410

## (undated) DEVICE — SOL NACL 0.9% INJ 1000ML BAG 2B1324X

## (undated) DEVICE — SU MONOCRYL 4-0 P-3 18" UND Y494G

## (undated) DEVICE — SUCTION MANIFOLD NEPTUNE 2 SYS 4 PORT 0702-020-000

## (undated) DEVICE — ESU HOLDER LAP INST DISP PURPLE LONG 330MM H-PRO-330

## (undated) DEVICE — SOL NACL 0.9% IRRIG 1000ML BOTTLE 2F7124

## (undated) DEVICE — ADH SKIN CLOSURE PREMIERPRO EXOFIN 1.0ML 3470

## (undated) DEVICE — ENDO TROCAR SLEEVE KII ADV FIXATION 05X100MM CFS02

## (undated) DEVICE — TUBING C02 INSUFFLATION LAP FILTER HEATER 6198

## (undated) DEVICE — PREP SKIN SCRUB TRAY 4461A

## (undated) DEVICE — ENDO TROCAR FIRST ENTRY KII FIOS ADV FIX 05X100MM CFF03

## (undated) DEVICE — SOL WATER IRRIG 1000ML BOTTLE 2F7114

## (undated) DEVICE — GLOVE PROTEXIS BLUE W/NEU-THERA 7.0  2D73EB70

## (undated) DEVICE — LINEN TOWEL PACK X5 5464

## (undated) DEVICE — PREP POVIDONE-IODINE 7.5% SCRUB 4OZ BOTTLE MDS093945

## (undated) DEVICE — SU VICRYL 0 CT-1 36" J346H

## (undated) RX ORDER — FENTANYL CITRATE 50 UG/ML
INJECTION, SOLUTION INTRAMUSCULAR; INTRAVENOUS
Status: DISPENSED
Start: 2021-08-09

## (undated) RX ORDER — LIDOCAINE HYDROCHLORIDE 10 MG/ML
INJECTION, SOLUTION EPIDURAL; INFILTRATION; INTRACAUDAL; PERINEURAL
Status: DISPENSED
Start: 2021-08-09

## (undated) RX ORDER — LIDOCAINE HYDROCHLORIDE AND EPINEPHRINE 10; 10 MG/ML; UG/ML
INJECTION, SOLUTION INFILTRATION; PERINEURAL
Status: DISPENSED
Start: 2021-08-09

## (undated) RX ORDER — OXYCODONE HYDROCHLORIDE 5 MG/1
TABLET ORAL
Status: DISPENSED
Start: 2021-08-09

## (undated) RX ORDER — ONDANSETRON 2 MG/ML
INJECTION INTRAMUSCULAR; INTRAVENOUS
Status: DISPENSED
Start: 2021-08-09

## (undated) RX ORDER — DEXAMETHASONE SODIUM PHOSPHATE 4 MG/ML
INJECTION, SOLUTION INTRA-ARTICULAR; INTRALESIONAL; INTRAMUSCULAR; INTRAVENOUS; SOFT TISSUE
Status: DISPENSED
Start: 2021-08-09

## (undated) RX ORDER — SCOLOPAMINE TRANSDERMAL SYSTEM 1 MG/1
PATCH, EXTENDED RELEASE TRANSDERMAL
Status: DISPENSED
Start: 2021-08-09

## (undated) RX ORDER — HYDROMORPHONE HYDROCHLORIDE 1 MG/ML
INJECTION, SOLUTION INTRAMUSCULAR; INTRAVENOUS; SUBCUTANEOUS
Status: DISPENSED
Start: 2021-08-09

## (undated) RX ORDER — ACETAMINOPHEN 325 MG/1
TABLET ORAL
Status: DISPENSED
Start: 2021-08-09

## (undated) RX ORDER — KETOROLAC TROMETHAMINE 30 MG/ML
INJECTION, SOLUTION INTRAMUSCULAR; INTRAVENOUS
Status: DISPENSED
Start: 2021-08-09

## (undated) RX ORDER — CLINDAMYCIN PHOSPHATE 900 MG/50ML
INJECTION, SOLUTION INTRAVENOUS
Status: DISPENSED
Start: 2021-08-09

## (undated) RX ORDER — PHENAZOPYRIDINE HYDROCHLORIDE 200 MG/1
TABLET, FILM COATED ORAL
Status: DISPENSED
Start: 2021-08-09

## (undated) RX ORDER — PROPOFOL 10 MG/ML
INJECTION, EMULSION INTRAVENOUS
Status: DISPENSED
Start: 2021-08-09